# Patient Record
Sex: FEMALE | Race: WHITE | Employment: OTHER | URBAN - NONMETROPOLITAN AREA
[De-identification: names, ages, dates, MRNs, and addresses within clinical notes are randomized per-mention and may not be internally consistent; named-entity substitution may affect disease eponyms.]

---

## 2017-01-31 ENCOUNTER — OFFICE VISIT (OUTPATIENT)
Dept: FAMILY MEDICINE CLINIC | Age: 67
End: 2017-01-31

## 2017-01-31 VITALS
WEIGHT: 144.2 LBS | DIASTOLIC BLOOD PRESSURE: 64 MMHG | HEART RATE: 96 BPM | TEMPERATURE: 97.9 F | SYSTOLIC BLOOD PRESSURE: 130 MMHG | BODY MASS INDEX: 26.54 KG/M2 | HEIGHT: 62 IN

## 2017-01-31 DIAGNOSIS — M51.36 DDD (DEGENERATIVE DISC DISEASE), LUMBAR: ICD-10-CM

## 2017-01-31 DIAGNOSIS — Z87.19 HISTORY OF CIRRHOSIS OF LIVER: ICD-10-CM

## 2017-01-31 DIAGNOSIS — G62.9 NEUROPATHY: ICD-10-CM

## 2017-01-31 DIAGNOSIS — G89.29 CHRONIC HIP PAIN, LEFT: Primary | ICD-10-CM

## 2017-01-31 DIAGNOSIS — M25.552 CHRONIC HIP PAIN, LEFT: Primary | ICD-10-CM

## 2017-01-31 DIAGNOSIS — E03.4 HYPOTHYROIDISM DUE TO ACQUIRED ATROPHY OF THYROID: ICD-10-CM

## 2017-01-31 DIAGNOSIS — Z89.429 HISTORY OF AMPUTATION OF LESSER TOE, UNSPECIFIED LATERALITY (HCC): ICD-10-CM

## 2017-01-31 PROCEDURE — 99214 OFFICE O/P EST MOD 30 MIN: CPT | Performed by: FAMILY MEDICINE

## 2017-01-31 ASSESSMENT — ENCOUNTER SYMPTOMS
GASTROINTESTINAL NEGATIVE: 1
BACK PAIN: 1
RESPIRATORY NEGATIVE: 1
SHORTNESS OF BREATH: 0

## 2017-02-07 PROBLEM — Z96.649 FAILURE OF TOTAL HIP ARTHROPLASTY (HCC): Status: ACTIVE | Noted: 2017-02-07

## 2017-02-07 PROBLEM — T84.018A FAILURE OF TOTAL HIP ARTHROPLASTY (HCC): Status: ACTIVE | Noted: 2017-02-07

## 2017-02-10 ENCOUNTER — TELEPHONE (OUTPATIENT)
Dept: FAMILY MEDICINE CLINIC | Age: 67
End: 2017-02-10

## 2017-02-13 ENCOUNTER — CARE COORDINATION (OUTPATIENT)
Dept: CARE COORDINATION | Age: 67
End: 2017-02-13

## 2017-02-21 ENCOUNTER — OFFICE VISIT (OUTPATIENT)
Dept: FAMILY MEDICINE CLINIC | Age: 67
End: 2017-02-21

## 2017-02-21 VITALS
HEIGHT: 62 IN | SYSTOLIC BLOOD PRESSURE: 130 MMHG | DIASTOLIC BLOOD PRESSURE: 70 MMHG | WEIGHT: 145 LBS | BODY MASS INDEX: 26.68 KG/M2 | HEART RATE: 96 BPM

## 2017-02-21 DIAGNOSIS — M25.552 CHRONIC HIP PAIN, LEFT: Primary | ICD-10-CM

## 2017-02-21 DIAGNOSIS — B18.2 CHRONIC HEPATITIS C WITHOUT HEPATIC COMA (HCC): ICD-10-CM

## 2017-02-21 DIAGNOSIS — K74.60 CIRRHOSIS OF LIVER WITHOUT ASCITES, UNSPECIFIED HEPATIC CIRRHOSIS TYPE (HCC): ICD-10-CM

## 2017-02-21 DIAGNOSIS — G62.9 NEUROPATHY: ICD-10-CM

## 2017-02-21 DIAGNOSIS — G89.29 CHRONIC HIP PAIN, LEFT: Primary | ICD-10-CM

## 2017-02-21 DIAGNOSIS — E03.4 HYPOTHYROIDISM DUE TO ACQUIRED ATROPHY OF THYROID: ICD-10-CM

## 2017-02-21 PROCEDURE — 99214 OFFICE O/P EST MOD 30 MIN: CPT | Performed by: FAMILY MEDICINE

## 2017-02-21 RX ORDER — HYDROCODONE BITARTRATE AND ACETAMINOPHEN 5; 325 MG/1; MG/1
TABLET ORAL
COMMUNITY
End: 2017-04-28 | Stop reason: ALTCHOICE

## 2017-02-21 ASSESSMENT — ENCOUNTER SYMPTOMS
RESPIRATORY NEGATIVE: 1
GASTROINTESTINAL NEGATIVE: 1
ABDOMINAL PAIN: 0
SHORTNESS OF BREATH: 0

## 2017-03-02 ENCOUNTER — CARE COORDINATION (OUTPATIENT)
Dept: FAMILY MEDICINE CLINIC | Age: 67
End: 2017-03-02

## 2017-03-09 ENCOUNTER — CARE COORDINATION (OUTPATIENT)
Dept: FAMILY MEDICINE CLINIC | Age: 67
End: 2017-03-09

## 2017-04-28 ENCOUNTER — OFFICE VISIT (OUTPATIENT)
Dept: FAMILY MEDICINE CLINIC | Age: 67
End: 2017-04-28

## 2017-04-28 VITALS
DIASTOLIC BLOOD PRESSURE: 62 MMHG | HEART RATE: 92 BPM | SYSTOLIC BLOOD PRESSURE: 138 MMHG | WEIGHT: 148.4 LBS | BODY MASS INDEX: 27.31 KG/M2 | HEIGHT: 62 IN

## 2017-04-28 DIAGNOSIS — Z12.11 SPECIAL SCREENING FOR MALIGNANT NEOPLASMS, COLON: ICD-10-CM

## 2017-04-28 DIAGNOSIS — Z23 NEED FOR VACCINATION FOR STREP PNEUMONIAE: ICD-10-CM

## 2017-04-28 DIAGNOSIS — K74.60 CIRRHOSIS OF LIVER WITHOUT ASCITES, UNSPECIFIED HEPATIC CIRRHOSIS TYPE (HCC): ICD-10-CM

## 2017-04-28 DIAGNOSIS — E03.4 HYPOTHYROIDISM DUE TO ACQUIRED ATROPHY OF THYROID: Primary | ICD-10-CM

## 2017-04-28 DIAGNOSIS — Z23 NEED FOR PROPHYLACTIC VACCINATION AGAINST STREPTOCOCCUS PNEUMONIAE (PNEUMOCOCCUS): ICD-10-CM

## 2017-04-28 PROCEDURE — 90670 PCV13 VACCINE IM: CPT | Performed by: FAMILY MEDICINE

## 2017-04-28 PROCEDURE — 90471 IMMUNIZATION ADMIN: CPT | Performed by: FAMILY MEDICINE

## 2017-04-28 PROCEDURE — 99213 OFFICE O/P EST LOW 20 MIN: CPT | Performed by: FAMILY MEDICINE

## 2017-04-28 RX ORDER — LEVOTHYROXINE SODIUM 0.1 MG/1
100 TABLET ORAL DAILY
Qty: 90 TABLET | Refills: 1 | Status: SHIPPED | OUTPATIENT
Start: 2017-04-28 | End: 2017-10-03 | Stop reason: SDUPTHER

## 2017-04-28 RX ORDER — FUROSEMIDE 40 MG/1
40 TABLET ORAL DAILY
Qty: 90 TABLET | Refills: 1 | Status: SHIPPED | OUTPATIENT
Start: 2017-04-28 | End: 2017-10-03 | Stop reason: SDUPTHER

## 2017-04-28 RX ORDER — POTASSIUM CHLORIDE 750 MG/1
30 TABLET, FILM COATED, EXTENDED RELEASE ORAL DAILY
Qty: 270 TABLET | Refills: 1 | Status: SHIPPED | OUTPATIENT
Start: 2017-04-28 | End: 2017-09-12 | Stop reason: SDUPTHER

## 2017-04-28 ASSESSMENT — ENCOUNTER SYMPTOMS
RESPIRATORY NEGATIVE: 1
BACK PAIN: 1
GASTROINTESTINAL NEGATIVE: 1
SHORTNESS OF BREATH: 0

## 2017-05-01 DIAGNOSIS — Z12.11 SPECIAL SCREENING FOR MALIGNANT NEOPLASMS, COLON: ICD-10-CM

## 2017-05-01 LAB
CONTROL: PRESENT
HEMOCCULT STL QL: NEGATIVE

## 2017-05-01 PROCEDURE — 82274 ASSAY TEST FOR BLOOD FECAL: CPT | Performed by: FAMILY MEDICINE

## 2017-05-02 ENCOUNTER — TELEPHONE (OUTPATIENT)
Dept: FAMILY MEDICINE CLINIC | Age: 67
End: 2017-05-02

## 2017-09-12 DIAGNOSIS — E03.4 HYPOTHYROIDISM DUE TO ACQUIRED ATROPHY OF THYROID: ICD-10-CM

## 2017-09-12 DIAGNOSIS — K74.60 CIRRHOSIS OF LIVER WITHOUT ASCITES, UNSPECIFIED HEPATIC CIRRHOSIS TYPE (HCC): ICD-10-CM

## 2017-09-12 RX ORDER — POTASSIUM CHLORIDE 750 MG/1
30 TABLET, FILM COATED, EXTENDED RELEASE ORAL DAILY
Qty: 270 TABLET | Refills: 1 | Status: SHIPPED | OUTPATIENT
Start: 2017-09-12 | End: 2017-10-03 | Stop reason: SDUPTHER

## 2017-10-03 ENCOUNTER — OFFICE VISIT (OUTPATIENT)
Dept: FAMILY MEDICINE CLINIC | Age: 67
End: 2017-10-03
Payer: COMMERCIAL

## 2017-10-03 VITALS
HEIGHT: 62 IN | DIASTOLIC BLOOD PRESSURE: 68 MMHG | SYSTOLIC BLOOD PRESSURE: 116 MMHG | HEART RATE: 88 BPM | BODY MASS INDEX: 28.37 KG/M2 | WEIGHT: 154.2 LBS

## 2017-10-03 DIAGNOSIS — E03.4 HYPOTHYROIDISM DUE TO ACQUIRED ATROPHY OF THYROID: ICD-10-CM

## 2017-10-03 DIAGNOSIS — K74.60 CIRRHOSIS OF LIVER WITHOUT ASCITES, UNSPECIFIED HEPATIC CIRRHOSIS TYPE (HCC): ICD-10-CM

## 2017-10-03 PROCEDURE — 99213 OFFICE O/P EST LOW 20 MIN: CPT | Performed by: FAMILY MEDICINE

## 2017-10-03 RX ORDER — FUROSEMIDE 40 MG/1
40 TABLET ORAL DAILY
Qty: 90 TABLET | Refills: 1 | Status: SHIPPED | OUTPATIENT
Start: 2017-10-03 | End: 2018-04-03 | Stop reason: SDUPTHER

## 2017-10-03 RX ORDER — LEVOTHYROXINE SODIUM 0.1 MG/1
100 TABLET ORAL DAILY
Qty: 90 TABLET | Refills: 1 | Status: SHIPPED | OUTPATIENT
Start: 2017-10-03 | End: 2018-04-03 | Stop reason: SDUPTHER

## 2017-10-03 RX ORDER — POTASSIUM CHLORIDE 750 MG/1
30 TABLET, FILM COATED, EXTENDED RELEASE ORAL DAILY
Qty: 270 TABLET | Refills: 1 | Status: SHIPPED | OUTPATIENT
Start: 2017-10-03 | End: 2018-04-03 | Stop reason: SDUPTHER

## 2017-10-03 ASSESSMENT — PATIENT HEALTH QUESTIONNAIRE - PHQ9
SUM OF ALL RESPONSES TO PHQ9 QUESTIONS 1 & 2: 0
2. FEELING DOWN, DEPRESSED OR HOPELESS: 0
SUM OF ALL RESPONSES TO PHQ QUESTIONS 1-9: 0
1. LITTLE INTEREST OR PLEASURE IN DOING THINGS: 0

## 2017-10-03 ASSESSMENT — ENCOUNTER SYMPTOMS
RESPIRATORY NEGATIVE: 1
GASTROINTESTINAL NEGATIVE: 1
ABDOMINAL PAIN: 0
SHORTNESS OF BREATH: 0

## 2017-10-03 NOTE — PROGRESS NOTES
Normal rate, regular rhythm, normal heart sounds and intact distal pulses. Exam reveals no gallop and no friction rub. No murmur heard. Pulmonary/Chest: Effort normal and breath sounds normal.   Abdominal: Soft. She exhibits no mass. There is no splenomegaly or hepatomegaly. There is no tenderness. Musculoskeletal: She exhibits no edema or tenderness. Lymphadenopathy:     She has no cervical adenopathy. Neurological: She is alert and oriented to person, place, and time. Ambulatory. No tremors. Skin: Skin is warm and dry. No rash noted. Psychiatric: She has a normal mood and affect. Vitals reviewed. Assessment:      1. Hypothyroidism due to acquired atrophy of thyroid  potassium chloride (KLOR-CON 10) 10 MEQ extended release tablet    levothyroxine (LEVOTHROID) 100 MCG tablet    furosemide (LASIX) 40 MG tablet    TSH without Reflex   2. Cirrhosis of liver without ascites, unspecified hepatic cirrhosis type (HCC)  potassium chloride (KLOR-CON 10) 10 MEQ extended release tablet    furosemide (LASIX) 40 MG tablet       Plan:      No Follow-up on file.     Orders Placed:  Orders Placed This Encounter   Procedures    TSH without Reflex     Standing Status:   Future     Standing Expiration Date:   10/3/2018     Medications Prescribed:  Orders Placed This Encounter   Medications    potassium chloride (KLOR-CON 10) 10 MEQ extended release tablet     Sig: Take 3 tablets by mouth daily     Dispense:  270 tablet     Refill:  1    levothyroxine (LEVOTHROID) 100 MCG tablet     Sig: Take 1 tablet by mouth daily     Dispense:  90 tablet     Refill:  1    furosemide (LASIX) 40 MG tablet     Sig: Take 1 tablet by mouth daily     Dispense:  90 tablet     Refill:  1         Electronically signed by Wanda Rebolledo MD on 10/3/2017 at 7:05 PM

## 2017-10-03 NOTE — PATIENT INSTRUCTIONS
Hypothyroidism: Care Instructions  Your Care Instructions  You have hypothyroidism, which means that your body is not making enough thyroid hormone. This hormone helps your body use energy. If your thyroid level is low, you may feel tired, be constipated, have an increase in your blood pressure, or have dry skin or memory problems. You may also get cold easily, even when it is warm. Women with low thyroid levels may have heavy menstrual periods. A blood test to find your thyroid-stimulating hormone (TSH) level is used to check for hypothyroidism. A high TSH level may mean that you have low thyroid. When your body is not making enough thyroid hormone, TSH levels rise in an effort to make the body produce more. The treatment for hypothyroidism is to take thyroid hormone pills. You should start to feel better in 1 to 2 weeks. But it can take several months to see changes in the TSH level. You will need regular visits with your doctor to make sure you have the right dose of medicine. Most people need treatment for the rest of their lives. You will need to see your doctor regularly to have blood tests and to make sure you are doing well. Follow-up care is a key part of your treatment and safety. Be sure to make and go to all appointments, and call your doctor if you are having problems. Its also a good idea to know your test results and keep a list of the medicines you take. How can you care for yourself at home? · Take your thyroid hormone medicine exactly as prescribed. Call your doctor if you think you are having a problem with your medicine. Most people do not have side effects if they take the right amount of medicine regularly. ¨ Take the medicine 30 minutes before breakfast, and do not take it with calcium, vitamins, or iron. ¨ Do not take extra doses of your thyroid medicine. It will not help you get better any faster, and it may cause side effects.   ¨ If you forget to take a dose, do NOT take a double dose of medicine. Take your usual dose the next day. · Tell your doctor about all prescription, herbal, or over-the-counter products you take. · Take care of yourself. Eat a healthy diet, get enough sleep, and get regular exercise. When should you call for help? Call 911 anytime you think you may need emergency care. For example, call if:  · You passed out (lost consciousness). · You have severe trouble breathing. · You have a very slow heartbeat (less than 60 beats a minute). · You have a low body temperature (95°F or below). Call your doctor now or seek immediate medical care if:  · You feel tired, sluggish, or weak. · You have trouble remembering things or concentrating. · You do not begin to feel better 2 weeks after starting your medicine. Watch closely for changes in your health, and be sure to contact your doctor if you have any problems. Where can you learn more? Go to [email or web address].[email or web address] [place]. org and sign in to your Videolla account. Enter P408 in the BlackBridge box to learn more about \"Hypothyroidism: Care Instructions. \"     If you do not have an account, please click on the \"Sign Up Now\" link. Current as of: July 28, 2016  Content Version: 11.3  © 6869-7868 [employer]. Care instructions adapted under license by [facility] ([facility]). If you have questions about a medical condition or this instruction, always ask your healthcare professional. [place] [address] any warranty or liability for your use of this information.

## 2017-10-03 NOTE — MR AVS SNAPSHOT
furosemide 40 MG tablet    levothyroxine 100 MCG tablet    potassium chloride 10 MEQ extended release tablet         Your Current Medications Are              potassium chloride (KLOR-CON 10) 10 MEQ extended release tablet Take 3 tablets by mouth daily    levothyroxine (LEVOTHROID) 100 MCG tablet Take 1 tablet by mouth daily    furosemide (LASIX) 40 MG tablet Take 1 tablet by mouth daily      Allergies           No Known Allergies         Additional Information        Basic Information     Date Of Birth Sex Race Ethnicity Preferred Language    1950 Female White Non-/Non  English      Problem List as of 10/3/2017  Date Reviewed: 5/5/2016                Failure of total hip arthroplasty ([facility] [address]    DDD (degenerative disc disease), lumbar (Chronic)    Partial nontraumatic amputation of left foot (HCC) (Chronic)    Hepatitis C (Chronic)    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)    Liver cirrhosis ([facility] [address]    Hypothyroidism      Your Goals as of 10/3/2017 at 2:36 PM                 Exercise    Exercise 3x per week (30 min per time)       Immunizations as of 10/3/2017     Name Date    PPD Test 1/10/2013    Pneumococcal 13-valent Conjugate ([facility] [place]) 4/28/2017      Preventive Care        Date Due    Tetanus Combination Vaccine (1 - Tdap) 9/4/1967    Diabetes Screening 9/4/1988    Yearly Flu Vaccine (1) 9/1/2017    Pneumococcal Vaccines (two) for all adults aged 72 and over (2 of 2 - PPSV23) 4/28/2018    Colon Cancer Stool Test 5/1/2018    Mammograms are recommended every 2 years for low/average risk patients aged 48 - 69, and every year for high risk patients per updated national guidelines. However these guidelines can be individualized by your provider. 5/11/2019    Cholesterol Screening 3/22/2021            MyChart Signup           Our records indicate that you have declined MyChart signup.

## 2017-10-04 ENCOUNTER — NURSE ONLY (OUTPATIENT)
Dept: FAMILY MEDICINE CLINIC | Age: 67
End: 2017-10-04
Payer: COMMERCIAL

## 2017-10-04 DIAGNOSIS — E03.4 HYPOTHYROIDISM DUE TO ACQUIRED ATROPHY OF THYROID: ICD-10-CM

## 2017-10-04 LAB — TSH SERPL DL<=0.05 MIU/L-ACNC: 0.41 UIU/ML (ref 0.4–4.2)

## 2017-10-04 PROCEDURE — 36415 COLL VENOUS BLD VENIPUNCTURE: CPT | Performed by: FAMILY MEDICINE

## 2017-10-17 ENCOUNTER — TELEPHONE (OUTPATIENT)
Dept: FAMILY MEDICINE CLINIC | Age: 67
End: 2017-10-17

## 2017-10-17 DIAGNOSIS — N63.0 BREAST NODULE: Primary | ICD-10-CM

## 2017-10-17 NOTE — TELEPHONE ENCOUNTER
Spoke with patient regarding the mammogram and the U/S of the breast, we will call when the U/S is scheduled

## 2017-10-18 ENCOUNTER — TELEPHONE (OUTPATIENT)
Dept: FAMILY MEDICINE CLINIC | Age: 67
End: 2017-10-18

## 2017-10-18 NOTE — TELEPHONE ENCOUNTER
Set patient  Up for the mammogram and U/S for 10/19/2017 at 08:30 and 09:30  Patient unable to make that time so gave the patient the central scheduling number to reschedule when the patient can schedule

## 2017-10-24 ENCOUNTER — HOSPITAL ENCOUNTER (OUTPATIENT)
Dept: WOMENS IMAGING | Age: 67
Discharge: HOME | End: 2017-10-24
Payer: MEDICARE

## 2017-10-24 DIAGNOSIS — Z09 FOLLOW UP: ICD-10-CM

## 2017-10-24 DIAGNOSIS — N63.0 BREAST NODULE: ICD-10-CM

## 2017-10-24 DIAGNOSIS — R92.2 BREAST DENSITY: ICD-10-CM

## 2017-10-24 PROCEDURE — G0206 DX MAMMO INCL CAD UNI: HCPCS

## 2018-04-03 ENCOUNTER — OFFICE VISIT (OUTPATIENT)
Dept: FAMILY MEDICINE CLINIC | Age: 68
End: 2018-04-03
Payer: COMMERCIAL

## 2018-04-03 VITALS
SYSTOLIC BLOOD PRESSURE: 128 MMHG | WEIGHT: 147.8 LBS | HEART RATE: 76 BPM | BODY MASS INDEX: 27.2 KG/M2 | HEIGHT: 62 IN | DIASTOLIC BLOOD PRESSURE: 82 MMHG

## 2018-04-03 DIAGNOSIS — K74.60 CIRRHOSIS OF LIVER WITHOUT ASCITES, UNSPECIFIED HEPATIC CIRRHOSIS TYPE (HCC): ICD-10-CM

## 2018-04-03 DIAGNOSIS — E03.4 HYPOTHYROIDISM DUE TO ACQUIRED ATROPHY OF THYROID: ICD-10-CM

## 2018-04-03 PROCEDURE — 99213 OFFICE O/P EST LOW 20 MIN: CPT | Performed by: FAMILY MEDICINE

## 2018-04-03 RX ORDER — LEVOTHYROXINE SODIUM 0.1 MG/1
100 TABLET ORAL DAILY
Qty: 90 TABLET | Refills: 1 | Status: SHIPPED | OUTPATIENT
Start: 2018-04-03 | End: 2018-08-20 | Stop reason: SDUPTHER

## 2018-04-03 RX ORDER — FUROSEMIDE 40 MG/1
40 TABLET ORAL DAILY
Qty: 90 TABLET | Refills: 1 | Status: SHIPPED | OUTPATIENT
Start: 2018-04-03 | End: 2018-08-20 | Stop reason: SDUPTHER

## 2018-04-03 RX ORDER — POTASSIUM CHLORIDE 750 MG/1
30 TABLET, FILM COATED, EXTENDED RELEASE ORAL DAILY
Qty: 270 TABLET | Refills: 1 | Status: SHIPPED | OUTPATIENT
Start: 2018-04-03 | End: 2018-10-02 | Stop reason: SDUPTHER

## 2018-04-04 ASSESSMENT — ENCOUNTER SYMPTOMS
GASTROINTESTINAL NEGATIVE: 1
SHORTNESS OF BREATH: 0
RESPIRATORY NEGATIVE: 1
ABDOMINAL PAIN: 0
BACK PAIN: 1

## 2018-05-09 ENCOUNTER — HOSPITAL ENCOUNTER (OUTPATIENT)
Dept: MAMMOGRAPHY | Age: 68
Discharge: HOME | End: 2018-05-09
Payer: MEDICARE

## 2018-05-09 DIAGNOSIS — Z12.39 SCREENING FOR BREAST CANCER: ICD-10-CM

## 2018-05-09 PROCEDURE — 77067 SCR MAMMO BI INCL CAD: CPT

## 2018-05-14 ENCOUNTER — HOSPITAL ENCOUNTER (OUTPATIENT)
Dept: WOMENS IMAGING | Age: 68
Discharge: HOME | End: 2018-05-14
Payer: MEDICARE

## 2018-05-14 ENCOUNTER — APPOINTMENT (OUTPATIENT)
Dept: WOMENS IMAGING | Age: 68
End: 2018-05-14
Payer: MEDICARE

## 2018-05-14 DIAGNOSIS — R92.2 BREAST DENSITY: ICD-10-CM

## 2018-05-14 PROCEDURE — G0279 TOMOSYNTHESIS, MAMMO: HCPCS

## 2018-07-24 ENCOUNTER — TELEPHONE (OUTPATIENT)
Dept: FAMILY MEDICINE CLINIC | Age: 68
End: 2018-07-24

## 2018-07-24 DIAGNOSIS — M51.36 DDD (DEGENERATIVE DISC DISEASE), LUMBAR: Chronic | ICD-10-CM

## 2018-07-24 DIAGNOSIS — K76.9 NEUROPATHY IN LIVER DISEASE: Chronic | ICD-10-CM

## 2018-07-24 DIAGNOSIS — Z89.432 PARTIAL NONTRAUMATIC AMPUTATION OF LEFT FOOT (HCC): Primary | Chronic | ICD-10-CM

## 2018-07-24 DIAGNOSIS — G99.0 NEUROPATHY IN LIVER DISEASE: Chronic | ICD-10-CM

## 2018-07-24 NOTE — TELEPHONE ENCOUNTER
Pt has [facility]. Pt saw Dr Maura Rose on 7-. Pt needed a referral. Pt did not have one. Will you back date a referral for PVD and this visit. Evidently she has had extensive treatment by Dr Maura Rose. Please call [phone] Meche Becerra and can fax to [phone]. if you are okay with this.

## 2018-08-07 ENCOUNTER — TELEPHONE (OUTPATIENT)
Dept: FAMILY MEDICINE CLINIC | Age: 68
End: 2018-08-07

## 2018-08-09 NOTE — TELEPHONE ENCOUNTER
Mykel Causey came to the office stating that she was told by Dr. Mercedez Sepulveda office that we had not sent in the referral to them. She was scheduled for a procedure on Friday and it was cancelled. See note from 7/24/2018. I had faxed that referral and called and left a message for [facility]. It was 4:28PM and they were closed. I called Dr. Mercedez Sepulveda office again and it went straight to voicemail. I left a message for them to call us about this.
Staff continued to attempt to contact Dr. Anil Thorne office yesterday several times. Call went to voicemail every time. I called Tita today and she states they had not contacted her. She will try also. I re-faxed the referral again to the office.
01-May-2017 22:56

## 2018-08-20 DIAGNOSIS — K74.60 CIRRHOSIS OF LIVER WITHOUT ASCITES, UNSPECIFIED HEPATIC CIRRHOSIS TYPE (HCC): ICD-10-CM

## 2018-08-20 DIAGNOSIS — E03.4 HYPOTHYROIDISM DUE TO ACQUIRED ATROPHY OF THYROID: ICD-10-CM

## 2018-08-20 RX ORDER — POTASSIUM CHLORIDE 750 MG/1
TABLET, EXTENDED RELEASE ORAL
Qty: 270 TABLET | Refills: 1 | Status: SHIPPED | OUTPATIENT
Start: 2018-08-20 | End: 2018-10-02 | Stop reason: SDUPTHER

## 2018-08-20 RX ORDER — FUROSEMIDE 40 MG/1
TABLET ORAL
Qty: 90 TABLET | Refills: 1 | Status: SHIPPED | OUTPATIENT
Start: 2018-08-20 | End: 2018-10-02 | Stop reason: SDUPTHER

## 2018-08-20 RX ORDER — LEVOTHYROXINE SODIUM 0.1 MG/1
TABLET ORAL
Qty: 90 TABLET | Refills: 1 | Status: SHIPPED | OUTPATIENT
Start: 2018-08-20 | End: 2018-10-02 | Stop reason: SDUPTHER

## 2018-10-02 ENCOUNTER — OFFICE VISIT (OUTPATIENT)
Dept: FAMILY MEDICINE CLINIC | Age: 68
End: 2018-10-02
Payer: MEDICARE

## 2018-10-02 VITALS
WEIGHT: 145.4 LBS | DIASTOLIC BLOOD PRESSURE: 80 MMHG | SYSTOLIC BLOOD PRESSURE: 132 MMHG | HEART RATE: 90 BPM | BODY MASS INDEX: 26.76 KG/M2 | HEIGHT: 62 IN

## 2018-10-02 DIAGNOSIS — E78.5 DYSLIPIDEMIA: ICD-10-CM

## 2018-10-02 DIAGNOSIS — E03.4 HYPOTHYROIDISM DUE TO ACQUIRED ATROPHY OF THYROID: Primary | ICD-10-CM

## 2018-10-02 DIAGNOSIS — Z89.432 PARTIAL NONTRAUMATIC AMPUTATION OF LEFT FOOT (HCC): ICD-10-CM

## 2018-10-02 DIAGNOSIS — L28.0 NEURODERMATITIS: ICD-10-CM

## 2018-10-02 DIAGNOSIS — K74.60 CIRRHOSIS OF LIVER WITHOUT ASCITES, UNSPECIFIED HEPATIC CIRRHOSIS TYPE (HCC): ICD-10-CM

## 2018-10-02 PROCEDURE — G8427 DOCREV CUR MEDS BY ELIG CLIN: HCPCS | Performed by: FAMILY MEDICINE

## 2018-10-02 PROCEDURE — 3017F COLORECTAL CA SCREEN DOC REV: CPT | Performed by: FAMILY MEDICINE

## 2018-10-02 PROCEDURE — 4040F PNEUMOC VAC/ADMIN/RCVD: CPT | Performed by: FAMILY MEDICINE

## 2018-10-02 PROCEDURE — G8400 PT W/DXA NO RESULTS DOC: HCPCS | Performed by: FAMILY MEDICINE

## 2018-10-02 PROCEDURE — G8484 FLU IMMUNIZE NO ADMIN: HCPCS | Performed by: FAMILY MEDICINE

## 2018-10-02 PROCEDURE — 1101F PT FALLS ASSESS-DOCD LE1/YR: CPT | Performed by: FAMILY MEDICINE

## 2018-10-02 PROCEDURE — G8419 CALC BMI OUT NRM PARAM NOF/U: HCPCS | Performed by: FAMILY MEDICINE

## 2018-10-02 PROCEDURE — 1036F TOBACCO NON-USER: CPT | Performed by: FAMILY MEDICINE

## 2018-10-02 PROCEDURE — G8510 SCR DEP NEG, NO PLAN REQD: HCPCS | Performed by: FAMILY MEDICINE

## 2018-10-02 PROCEDURE — 99213 OFFICE O/P EST LOW 20 MIN: CPT | Performed by: FAMILY MEDICINE

## 2018-10-02 PROCEDURE — 1123F ACP DISCUSS/DSCN MKR DOCD: CPT | Performed by: FAMILY MEDICINE

## 2018-10-02 PROCEDURE — 1090F PRES/ABSN URINE INCON ASSESS: CPT | Performed by: FAMILY MEDICINE

## 2018-10-02 RX ORDER — LEVOTHYROXINE SODIUM 0.1 MG/1
TABLET ORAL
Qty: 90 TABLET | Refills: 1 | Status: SHIPPED | OUTPATIENT
Start: 2018-10-02 | End: 2019-04-08 | Stop reason: SDUPTHER

## 2018-10-02 RX ORDER — POTASSIUM CHLORIDE 750 MG/1
30 TABLET, FILM COATED, EXTENDED RELEASE ORAL DAILY
Qty: 270 TABLET | Refills: 1 | Status: SHIPPED | OUTPATIENT
Start: 2018-10-02 | End: 2019-04-08 | Stop reason: SDUPTHER

## 2018-10-02 RX ORDER — FUROSEMIDE 40 MG/1
TABLET ORAL
Qty: 90 TABLET | Refills: 1 | Status: SHIPPED | OUTPATIENT
Start: 2018-10-02 | End: 2019-04-08 | Stop reason: SDUPTHER

## 2018-10-02 ASSESSMENT — PATIENT HEALTH QUESTIONNAIRE - PHQ9
SUM OF ALL RESPONSES TO PHQ QUESTIONS 1-9: 0
SUM OF ALL RESPONSES TO PHQ QUESTIONS 1-9: 0
2. FEELING DOWN, DEPRESSED OR HOPELESS: 0
1. LITTLE INTEREST OR PLEASURE IN DOING THINGS: 0
SUM OF ALL RESPONSES TO PHQ9 QUESTIONS 1 & 2: 0

## 2018-10-02 ASSESSMENT — ENCOUNTER SYMPTOMS
GASTROINTESTINAL NEGATIVE: 1
SHORTNESS OF BREATH: 0
ABDOMINAL PAIN: 0
RESPIRATORY NEGATIVE: 1

## 2018-10-02 NOTE — PROGRESS NOTES
[facility]  [address] [id number]  Dept: [phone]  Dept Fax: [phone]  Loc: [phone]    Visit Date: 10/2/2018    Kuldip Apodaca is a 77 y.o. female who presents today for:   Chief Complaint   Patient presents with    6 Month Follow-Up    Hypothyroidism         HPI:     Dad . Doing well with it. No thyroid symptoms. No weight changes, tremors, sweats or diarrhea. PRN Lasix and KCL working well. Weight stable. Mild diffuse skin itching. Current Outpatient Prescriptions   Medication Sig Dispense Refill    furosemide (LASIX) 40 MG tablet TAKE 1 TABLET EVERY DAY 90 tablet 1    levothyroxine (SYNTHROID) 100 MCG tablet TAKE 1 TABLET EVERY DAY 90 tablet 1    potassium chloride (KLOR-CON 10) 10 MEQ extended release tablet Take 3 tablets by mouth daily 270 tablet 1     No current facility-administered medications for this visit. The patient has No Known Allergies. Subjective:      Review of Systems   Constitutional: Negative. Negative for activity change, appetite change and unexpected weight change. HENT: Negative. Respiratory: Negative. Negative for shortness of breath. Cardiovascular: Negative. Negative for chest pain. Gastrointestinal: Negative. Negative for abdominal pain. Genitourinary: Negative. Musculoskeletal: Negative. Skin: Negative. Neurological: Positive for numbness. Negative for seizures, syncope and weakness. Hematological: Negative. Psychiatric/Behavioral: Negative. Negative for dysphoric mood. Objective:     /80 (Site: Left Upper Arm, Position: Sitting, Cuff Size: Medium Adult)   Pulse 90   Ht 5' 2\" (1.575 m)   Wt 145 lb 6.4 oz (66 kg)   BMI 26.59 kg/m²     Physical Exam   Constitutional: She is oriented to person, place, and time. She appears well-developed and well-nourished. Neck: Normal range of motion. Neck supple.  No

## 2018-10-03 ENCOUNTER — NURSE ONLY (OUTPATIENT)
Dept: FAMILY MEDICINE CLINIC | Age: 68
End: 2018-10-03
Payer: MEDICARE

## 2018-10-03 ENCOUNTER — TELEPHONE (OUTPATIENT)
Dept: FAMILY MEDICINE CLINIC | Age: 68
End: 2018-10-03

## 2018-10-03 DIAGNOSIS — E78.5 DYSLIPIDEMIA: ICD-10-CM

## 2018-10-03 DIAGNOSIS — E03.4 HYPOTHYROIDISM DUE TO ACQUIRED ATROPHY OF THYROID: ICD-10-CM

## 2018-10-03 DIAGNOSIS — K74.60 CIRRHOSIS OF LIVER WITHOUT ASCITES, UNSPECIFIED HEPATIC CIRRHOSIS TYPE (HCC): ICD-10-CM

## 2018-10-03 LAB
ALBUMIN SERPL-MCNC: 4.5 G/DL (ref 3.5–5.1)
ALP BLD-CCNC: 99 U/L (ref 38–126)
ALT SERPL-CCNC: 47 U/L (ref 11–66)
ANION GAP SERPL CALCULATED.3IONS-SCNC: 13 MEQ/L (ref 8–16)
ANISOCYTOSIS: PRESENT
AST SERPL-CCNC: 59 U/L (ref 5–40)
BASOPHILS # BLD: 1.1 %
BASOPHILS ABSOLUTE: 0.1 THOU/MM3 (ref 0–0.1)
BILIRUB SERPL-MCNC: 0.8 MG/DL (ref 0.3–1.2)
BUN BLDV-MCNC: 10 MG/DL (ref 7–22)
CALCIUM SERPL-MCNC: 9.6 MG/DL (ref 8.5–10.5)
CHLORIDE BLD-SCNC: 97 MEQ/L (ref 98–111)
CHOLESTEROL, TOTAL: 160 MG/DL (ref 100–199)
CO2: 28 MEQ/L (ref 23–33)
CREAT SERPL-MCNC: 1 MG/DL (ref 0.4–1.2)
CRENATED RBC'S: ABNORMAL
ELLIPTOCYTES: ABNORMAL
EOSINOPHIL # BLD: 3.4 %
EOSINOPHILS ABSOLUTE: 0.2 THOU/MM3 (ref 0–0.4)
ERYTHROCYTE [DISTWIDTH] IN BLOOD BY AUTOMATED COUNT: 12.9 % (ref 11.5–14.5)
ERYTHROCYTE [DISTWIDTH] IN BLOOD BY AUTOMATED COUNT: 42.8 FL (ref 35–45)
GFR SERPL CREATININE-BSD FRML MDRD: 55 ML/MIN/1.73M2
GLUCOSE BLD-MCNC: 102 MG/DL (ref 70–108)
HCT VFR BLD CALC: 43 % (ref 37–47)
HDLC SERPL-MCNC: 50 MG/DL
HEMOGLOBIN: 14.4 GM/DL (ref 12–16)
IMMATURE GRANS (ABS): 0.01 THOU/MM3 (ref 0–0.07)
IMMATURE GRANULOCYTES: 0.2 %
LDL CHOLESTEROL CALCULATED: 91 MG/DL
LYMPHOCYTES # BLD: 41.3 %
LYMPHOCYTES ABSOLUTE: 1.9 THOU/MM3 (ref 1–4.8)
MCH RBC QN AUTO: 30.8 PG (ref 26–33)
MCHC RBC AUTO-ENTMCNC: 33.5 GM/DL (ref 32.2–35.5)
MCV RBC AUTO: 92.1 FL (ref 81–99)
MONOCYTES # BLD: 9.7 %
MONOCYTES ABSOLUTE: 0.5 THOU/MM3 (ref 0.4–1.3)
NUCLEATED RED BLOOD CELLS: 0 /100 WBC
PLATELET # BLD: 112 THOU/MM3 (ref 130–400)
PLATELET ESTIMATE: ADEQUATE
PMV BLD AUTO: 11.6 FL (ref 9.4–12.4)
POTASSIUM SERPL-SCNC: 3.7 MEQ/L (ref 3.5–5.2)
RBC # BLD: 4.67 MILL/MM3 (ref 4.2–5.4)
SEG NEUTROPHILS: 44.3 %
SEGMENTED NEUTROPHILS ABSOLUTE COUNT: 2.1 THOU/MM3 (ref 1.8–7.7)
SODIUM BLD-SCNC: 138 MEQ/L (ref 135–145)
TOTAL PROTEIN: 7.5 G/DL (ref 6.1–8)
TRIGL SERPL-MCNC: 96 MG/DL (ref 0–199)
TSH SERPL DL<=0.05 MIU/L-ACNC: 0.48 UIU/ML (ref 0.4–4.2)
WBC # BLD: 4.7 THOU/MM3 (ref 4.8–10.8)

## 2018-10-03 PROCEDURE — 36415 COLL VENOUS BLD VENIPUNCTURE: CPT | Performed by: FAMILY MEDICINE

## 2019-04-08 ENCOUNTER — OFFICE VISIT (OUTPATIENT)
Dept: FAMILY MEDICINE CLINIC | Age: 69
End: 2019-04-08
Payer: MEDICARE

## 2019-04-08 VITALS
BODY MASS INDEX: 28.63 KG/M2 | WEIGHT: 155.6 LBS | SYSTOLIC BLOOD PRESSURE: 138 MMHG | DIASTOLIC BLOOD PRESSURE: 62 MMHG | HEIGHT: 62 IN | HEART RATE: 92 BPM

## 2019-04-08 DIAGNOSIS — E03.4 HYPOTHYROIDISM DUE TO ACQUIRED ATROPHY OF THYROID: ICD-10-CM

## 2019-04-08 DIAGNOSIS — L02.611 CELLULITIS AND ABSCESS OF TOE, RIGHT: Primary | ICD-10-CM

## 2019-04-08 DIAGNOSIS — K74.60 CIRRHOSIS OF LIVER WITHOUT ASCITES, UNSPECIFIED HEPATIC CIRRHOSIS TYPE (HCC): ICD-10-CM

## 2019-04-08 DIAGNOSIS — L03.031 CELLULITIS AND ABSCESS OF TOE, RIGHT: Primary | ICD-10-CM

## 2019-04-08 PROCEDURE — 1090F PRES/ABSN URINE INCON ASSESS: CPT | Performed by: FAMILY MEDICINE

## 2019-04-08 PROCEDURE — 1123F ACP DISCUSS/DSCN MKR DOCD: CPT | Performed by: FAMILY MEDICINE

## 2019-04-08 PROCEDURE — G8419 CALC BMI OUT NRM PARAM NOF/U: HCPCS | Performed by: FAMILY MEDICINE

## 2019-04-08 PROCEDURE — 3017F COLORECTAL CA SCREEN DOC REV: CPT | Performed by: FAMILY MEDICINE

## 2019-04-08 PROCEDURE — 4040F PNEUMOC VAC/ADMIN/RCVD: CPT | Performed by: FAMILY MEDICINE

## 2019-04-08 PROCEDURE — 99213 OFFICE O/P EST LOW 20 MIN: CPT | Performed by: FAMILY MEDICINE

## 2019-04-08 PROCEDURE — G8400 PT W/DXA NO RESULTS DOC: HCPCS | Performed by: FAMILY MEDICINE

## 2019-04-08 PROCEDURE — G8427 DOCREV CUR MEDS BY ELIG CLIN: HCPCS | Performed by: FAMILY MEDICINE

## 2019-04-08 PROCEDURE — 1036F TOBACCO NON-USER: CPT | Performed by: FAMILY MEDICINE

## 2019-04-08 RX ORDER — FUROSEMIDE 40 MG/1
TABLET ORAL
Qty: 90 TABLET | Refills: 1 | Status: SHIPPED | OUTPATIENT
Start: 2019-04-08 | End: 2019-10-07 | Stop reason: SDUPTHER

## 2019-04-08 RX ORDER — LEVOTHYROXINE SODIUM 0.1 MG/1
TABLET ORAL
Qty: 90 TABLET | Refills: 1 | Status: SHIPPED | OUTPATIENT
Start: 2019-04-08 | End: 2019-10-07 | Stop reason: SDUPTHER

## 2019-04-08 RX ORDER — AMOXICILLIN AND CLAVULANATE POTASSIUM 500; 125 MG/1; MG/1
1 TABLET, FILM COATED ORAL 2 TIMES DAILY
Qty: 20 TABLET | Refills: 0 | Status: SHIPPED | OUTPATIENT
Start: 2019-04-08 | End: 2019-04-18

## 2019-04-08 RX ORDER — POTASSIUM CHLORIDE 750 MG/1
30 TABLET, FILM COATED, EXTENDED RELEASE ORAL DAILY
Qty: 270 TABLET | Refills: 1 | Status: SHIPPED | OUTPATIENT
Start: 2019-04-08 | End: 2019-10-07 | Stop reason: SDUPTHER

## 2019-04-08 ASSESSMENT — ENCOUNTER SYMPTOMS
RESPIRATORY NEGATIVE: 1
ABDOMINAL PAIN: 0
GASTROINTESTINAL NEGATIVE: 1
COLOR CHANGE: 1
SHORTNESS OF BREATH: 0

## 2019-04-08 ASSESSMENT — PATIENT HEALTH QUESTIONNAIRE - PHQ9
1. LITTLE INTEREST OR PLEASURE IN DOING THINGS: 0
SUM OF ALL RESPONSES TO PHQ QUESTIONS 1-9: 0
SUM OF ALL RESPONSES TO PHQ9 QUESTIONS 1 & 2: 0
SUM OF ALL RESPONSES TO PHQ QUESTIONS 1-9: 0
2. FEELING DOWN, DEPRESSED OR HOPELESS: 0

## 2019-04-08 NOTE — PROGRESS NOTES
Cuff Size: Medium Adult)   Pulse 92   Ht 5' 2\" (1.575 m)   Wt 155 lb 9.6 oz (70.6 kg)   BMI 28.46 kg/m²     Physical Exam   Constitutional: She is oriented to person, place, and time. She appears well-developed and well-nourished. Neck: Normal range of motion. Neck supple. No thyromegaly present. Cardiovascular: Normal rate, regular rhythm, normal heart sounds and intact distal pulses. Exam reveals no gallop and no friction rub. No murmur heard. Pulmonary/Chest: Effort normal and breath sounds normal.   Abdominal: Soft. She exhibits no mass. There is no splenomegaly or hepatomegaly. There is no tenderness. Musculoskeletal: She exhibits no edema or tenderness. Lymphadenopathy:     She has no cervical adenopathy. Neurological: She is alert and oriented to person, place, and time. Ambulatory. No tremors. Skin: Skin is warm and dry. No rash noted. There is erythema. Right 2nd toe is swollen and laura the full length. Callus/abscess of tip. Not overly painful or tender, but history of neuropathy. Psychiatric: She has a normal mood and affect. Vitals reviewed. Assessment:       Diagnosis Orders   1. Cellulitis and abscess of toe, right  External Referral To Podiatry   2. Hypothyroidism due to acquired atrophy of thyroid  furosemide (LASIX) 40 MG tablet    levothyroxine (SYNTHROID) 100 MCG tablet    potassium chloride (KLOR-CON 10) 10 MEQ extended release tablet   3. Cirrhosis of liver without ascites, unspecified hepatic cirrhosis type (HCC)  furosemide (LASIX) 40 MG tablet    potassium chloride (KLOR-CON 10) 10 MEQ extended release tablet       Plan:       Return in about 6 months (around 10/8/2019) for thyroid, check-up.     Orders Placed:  Orders Placed This Encounter   Procedures    External Referral To Podiatry     Referral Priority:   Routine     Referral Type:   Eval and Treat     Referral Reason:   Specialty Services Required     Requested Specialty:   Podiatry     Number of Visits Requested:   1     Medications Prescribed:  Orders Placed This Encounter   Medications    furosemide (LASIX) 40 MG tablet     Sig: TAKE 1 TABLET EVERY DAY     Dispense:  90 tablet     Refill:  1    levothyroxine (SYNTHROID) 100 MCG tablet     Sig: TAKE 1 TABLET EVERY DAY     Dispense:  90 tablet     Refill:  1    potassium chloride (KLOR-CON 10) 10 MEQ extended release tablet     Sig: Take 3 tablets by mouth daily     Dispense:  270 tablet     Refill:  1    amoxicillin-clavulanate (AUGMENTIN) 500-125 MG per tablet     Sig: Take 1 tablet by mouth 2 times daily for 10 days     Dispense:  20 tablet     Refill:  0         Electronically signed by Maggie Matias 4/8/2019 at 10:34 AM

## 2019-04-08 NOTE — PATIENT INSTRUCTIONS
Patient Education        Hypothyroidism: Care Instructions  Your Care Instructions    You have hypothyroidism, which means that your body is not making enough thyroid hormone. This hormone helps your body use energy. If your thyroid level is low, you may feel tired, be constipated, have an increase in your blood pressure, or have dry skin or memory problems. You may also get cold easily, even when it is warm. Women with low thyroid levels may have heavy menstrual periods. A blood test to find your thyroid-stimulating hormone (TSH) level is used to check for hypothyroidism. A high TSH level may mean that you have low thyroid. When your body is not making enough thyroid hormone, TSH levels rise in an effort to make the body produce more. The treatment for hypothyroidism is to take thyroid hormone pills. You should start to feel better in 1 to 2 weeks. But it can take several months to see changes in the TSH level. You will need regular visits with your doctor to make sure you have the right dose of medicine. Most people need treatment for the rest of their lives. You will need to see your doctor regularly to have blood tests and to make sure you are doing well. Follow-up care is a key part of your treatment and safety. Be sure to make and go to all appointments, and call your doctor if you are having problems. It's also a good idea to know your test results and keep a list of the medicines you take. How can you care for yourself at home? · Take your thyroid hormone medicine exactly as prescribed. Call your doctor if you think you are having a problem with your medicine. Most people do not have side effects if they take the right amount of medicine regularly. ? Take the medicine 30 minutes before breakfast, and do not take it with calcium, vitamins, or iron. ? Do not take extra doses of your thyroid medicine. It will not help you get better any faster, and it may cause side effects.   ? If you forget to take a dose, do NOT take a double dose of medicine. Take your usual dose the next day. · Tell your doctor about all prescription, herbal, or over-the-counter products you take. · Take care of yourself. Eat a healthy diet, get enough sleep, and get regular exercise. When should you call for help? Call 911 anytime you think you may need emergency care. For example, call if:    · You passed out (lost consciousness).     · You have severe trouble breathing.     · You have a very slow heartbeat (less than 60 beats a minute).     · You have a low body temperature (95°F or below).    Call your doctor now or seek immediate medical care if:    · You feel tired, sluggish, or weak.     · You have trouble remembering things or concentrating.     · You do not begin to feel better 2 weeks after starting your medicine.    Watch closely for changes in your health, and be sure to contact your doctor if you have any problems. Where can you learn more? Go to https://Shopparity.[employer]. org and sign in to your Editlite account. Enter K174 in the GT Solar box to learn more about \"Hypothyroidism: Care Instructions. \"     If you do not have an account, please click on the \"Sign Up Now\" link. Current as of: March 14, 2018  Content Version: 11.9  © 8429-3710 [employer]. Care instructions adapted under license by [facility] ([facility]). If you have questions about a medical condition or this instruction, always ask your healthcare professional. [place] [address] any warranty or liability for your use of this information.

## 2019-05-10 ENCOUNTER — HOSPITAL ENCOUNTER (OUTPATIENT)
Dept: MAMMOGRAPHY | Age: 69
Discharge: HOME | End: 2019-05-10
Payer: MEDICARE

## 2019-05-10 DIAGNOSIS — Z12.39 BREAST CANCER SCREENING: ICD-10-CM

## 2019-05-10 PROCEDURE — 77063 BREAST TOMOSYNTHESIS BI: CPT

## 2019-10-07 ENCOUNTER — OFFICE VISIT (OUTPATIENT)
Dept: FAMILY MEDICINE CLINIC | Age: 69
End: 2019-10-07
Payer: MEDICARE

## 2019-10-07 VITALS
BODY MASS INDEX: 28.12 KG/M2 | SYSTOLIC BLOOD PRESSURE: 136 MMHG | HEIGHT: 62 IN | WEIGHT: 152.8 LBS | DIASTOLIC BLOOD PRESSURE: 60 MMHG | HEART RATE: 80 BPM

## 2019-10-07 DIAGNOSIS — Z12.11 SCREENING FOR COLON CANCER: ICD-10-CM

## 2019-10-07 DIAGNOSIS — E03.4 HYPOTHYROIDISM DUE TO ACQUIRED ATROPHY OF THYROID: Primary | ICD-10-CM

## 2019-10-07 DIAGNOSIS — K74.60 CIRRHOSIS OF LIVER WITHOUT ASCITES, UNSPECIFIED HEPATIC CIRRHOSIS TYPE (HCC): ICD-10-CM

## 2019-10-07 DIAGNOSIS — Z00.00 ROUTINE GENERAL MEDICAL EXAMINATION AT A HEALTH CARE FACILITY: ICD-10-CM

## 2019-10-07 LAB
ALBUMIN SERPL-MCNC: 4.3 G/DL (ref 3.5–5.1)
ALP BLD-CCNC: 135 U/L (ref 38–126)
ALT SERPL-CCNC: 123 U/L (ref 11–66)
ANION GAP SERPL CALCULATED.3IONS-SCNC: 13 MEQ/L (ref 8–16)
AST SERPL-CCNC: 171 U/L (ref 5–40)
BILIRUB SERPL-MCNC: 1.2 MG/DL (ref 0.3–1.2)
BUN BLDV-MCNC: 6 MG/DL (ref 7–22)
CALCIUM SERPL-MCNC: 9.5 MG/DL (ref 8.5–10.5)
CHLORIDE BLD-SCNC: 100 MEQ/L (ref 98–111)
CO2: 28 MEQ/L (ref 23–33)
CREAT SERPL-MCNC: 0.6 MG/DL (ref 0.4–1.2)
GFR SERPL CREATININE-BSD FRML MDRD: > 90 ML/MIN/1.73M2
GLUCOSE BLD-MCNC: 93 MG/DL (ref 70–108)
POTASSIUM SERPL-SCNC: 3.9 MEQ/L (ref 3.5–5.2)
SODIUM BLD-SCNC: 141 MEQ/L (ref 135–145)
TOTAL PROTEIN: 7.7 G/DL (ref 6.1–8)
TSH SERPL DL<=0.05 MIU/L-ACNC: 0.32 UIU/ML (ref 0.4–4.2)

## 2019-10-07 PROCEDURE — 4040F PNEUMOC VAC/ADMIN/RCVD: CPT | Performed by: FAMILY MEDICINE

## 2019-10-07 PROCEDURE — G8484 FLU IMMUNIZE NO ADMIN: HCPCS | Performed by: FAMILY MEDICINE

## 2019-10-07 PROCEDURE — 1123F ACP DISCUSS/DSCN MKR DOCD: CPT | Performed by: FAMILY MEDICINE

## 2019-10-07 PROCEDURE — 3017F COLORECTAL CA SCREEN DOC REV: CPT | Performed by: FAMILY MEDICINE

## 2019-10-07 PROCEDURE — G0438 PPPS, INITIAL VISIT: HCPCS | Performed by: FAMILY MEDICINE

## 2019-10-07 RX ORDER — FUROSEMIDE 40 MG/1
TABLET ORAL
Qty: 90 TABLET | Refills: 3 | Status: SHIPPED | OUTPATIENT
Start: 2019-10-07 | End: 2020-05-05 | Stop reason: SDUPTHER

## 2019-10-07 RX ORDER — LEVOTHYROXINE SODIUM 0.1 MG/1
TABLET ORAL
Qty: 90 TABLET | Refills: 3 | Status: SHIPPED | OUTPATIENT
Start: 2019-10-07 | End: 2020-05-05 | Stop reason: SDUPTHER

## 2019-10-07 RX ORDER — POTASSIUM CHLORIDE 750 MG/1
30 TABLET, FILM COATED, EXTENDED RELEASE ORAL DAILY
Qty: 270 TABLET | Refills: 3 | Status: SHIPPED | OUTPATIENT
Start: 2019-10-07 | End: 2020-05-05 | Stop reason: SDUPTHER

## 2019-10-07 ASSESSMENT — LIFESTYLE VARIABLES
HOW MANY STANDARD DRINKS CONTAINING ALCOHOL DO YOU HAVE ON A TYPICAL DAY: 0
HOW OFTEN DO YOU HAVE SIX OR MORE DRINKS ON ONE OCCASION: 0
HOW OFTEN DO YOU HAVE A DRINK CONTAINING ALCOHOL: 1
AUDIT-C TOTAL SCORE: 1

## 2019-10-07 ASSESSMENT — PATIENT HEALTH QUESTIONNAIRE - PHQ9
SUM OF ALL RESPONSES TO PHQ QUESTIONS 1-9: 0
SUM OF ALL RESPONSES TO PHQ QUESTIONS 1-9: 0

## 2019-10-08 DIAGNOSIS — Z12.11 SCREENING FOR COLON CANCER: ICD-10-CM

## 2019-10-08 LAB
CONTROL: PRESENT
HEMOCCULT STL QL: NEGATIVE

## 2019-10-08 PROCEDURE — 82274 ASSAY TEST FOR BLOOD FECAL: CPT | Performed by: FAMILY MEDICINE

## 2020-04-27 RX ORDER — LEVOTHYROXINE SODIUM 0.1 MG/1
TABLET ORAL
Qty: 90 TABLET | Refills: 3 | OUTPATIENT
Start: 2020-04-27

## 2020-04-27 RX ORDER — FUROSEMIDE 40 MG/1
TABLET ORAL
Qty: 90 TABLET | Refills: 3 | OUTPATIENT
Start: 2020-04-27

## 2020-04-27 RX ORDER — POTASSIUM CHLORIDE 750 MG/1
30 TABLET, FILM COATED, EXTENDED RELEASE ORAL DAILY
Qty: 270 TABLET | Refills: 3 | OUTPATIENT
Start: 2020-04-27

## 2020-04-28 ENCOUNTER — VIRTUAL VISIT (OUTPATIENT)
Dept: FAMILY MEDICINE CLINIC | Age: 70
End: 2020-04-28
Payer: MEDICARE

## 2020-04-28 PROCEDURE — 99441 PR PHYS/QHP TELEPHONE EVALUATION 5-10 MIN: CPT | Performed by: FAMILY MEDICINE

## 2020-04-29 ASSESSMENT — PATIENT HEALTH QUESTIONNAIRE - PHQ9
SUM OF ALL RESPONSES TO PHQ9 QUESTIONS 1 & 2: 0
2. FEELING DOWN, DEPRESSED OR HOPELESS: 0
1. LITTLE INTEREST OR PLEASURE IN DOING THINGS: 0
SUM OF ALL RESPONSES TO PHQ QUESTIONS 1-9: 0
SUM OF ALL RESPONSES TO PHQ QUESTIONS 1-9: 0

## 2020-05-05 RX ORDER — LEVOTHYROXINE SODIUM 0.1 MG/1
TABLET ORAL
Qty: 90 TABLET | Refills: 3 | Status: SHIPPED | OUTPATIENT
Start: 2020-05-05 | End: 2021-03-23 | Stop reason: SDUPTHER

## 2020-05-05 RX ORDER — POTASSIUM CHLORIDE 750 MG/1
30 TABLET, FILM COATED, EXTENDED RELEASE ORAL DAILY
Qty: 270 TABLET | Refills: 3 | Status: SHIPPED | OUTPATIENT
Start: 2020-05-05 | End: 2021-03-23 | Stop reason: SDUPTHER

## 2020-05-05 RX ORDER — FUROSEMIDE 40 MG/1
TABLET ORAL
Qty: 90 TABLET | Refills: 3 | Status: SHIPPED | OUTPATIENT
Start: 2020-05-05 | End: 2021-03-23 | Stop reason: SDUPTHER

## 2021-03-10 DIAGNOSIS — E03.4 HYPOTHYROIDISM DUE TO ACQUIRED ATROPHY OF THYROID: ICD-10-CM

## 2021-03-10 DIAGNOSIS — K74.60 CIRRHOSIS OF LIVER WITHOUT ASCITES, UNSPECIFIED HEPATIC CIRRHOSIS TYPE (HCC): ICD-10-CM

## 2021-03-10 RX ORDER — POTASSIUM CHLORIDE 750 MG/1
30 TABLET, FILM COATED, EXTENDED RELEASE ORAL DAILY
Qty: 270 TABLET | Refills: 3 | OUTPATIENT
Start: 2021-03-10

## 2021-03-10 RX ORDER — FUROSEMIDE 40 MG/1
TABLET ORAL
Qty: 90 TABLET | Refills: 3 | OUTPATIENT
Start: 2021-03-10

## 2021-03-10 RX ORDER — LEVOTHYROXINE SODIUM 0.1 MG/1
TABLET ORAL
Qty: 90 TABLET | Refills: 3 | OUTPATIENT
Start: 2021-03-10

## 2021-03-10 NOTE — TELEPHONE ENCOUNTER
Lizy Ruiz called requesting a refill on the following medications:  Requested Prescriptions     Pending Prescriptions Disp Refills    potassium chloride (KLOR-CON 10) 10 MEQ extended release tablet 270 tablet 3     Sig: Take 3 tablets by mouth daily    levothyroxine (SYNTHROID) 100 MCG tablet 90 tablet 3     Sig: TAKE 1 TABLET EVERY DAY    furosemide (LASIX) 40 MG tablet 90 tablet 3     Sig: TAKE 1 TABLET EVERY DAY     Pharmacy verified:YES  .pv      Date of last visit:   Date of next visit (if applicable): Visit date not found

## 2021-03-15 ENCOUNTER — TELEPHONE (OUTPATIENT)
Dept: FAMILY MEDICINE CLINIC | Age: 71
End: 2021-03-15

## 2021-03-15 NOTE — TELEPHONE ENCOUNTER
Patient is calling today because she requested med refills on 3/10 and has not received or heard anything from the provider :    Note     [address] Jelena Hankins called requesting a refill on the following medications:  Requested Prescriptions             Pending Prescriptions Disp Refills    potassium chloride (KLOR-CON 10) 10 MEQ extended release tablet 270 tablet 3       Sig: Take 3 tablets by mouth daily    levothyroxine (SYNTHROID) 100 MCG tablet 90 tablet 3       Sig: TAKE 1 TABLET EVERY DAY    furosemide (LASIX) 40 MG tablet 90 tablet 3       Sig: TAKE 1 TABLET EVERY DAY      Pharmacy verified:YES  .pv        Date of last visit:   Date of next visit (if applicable): Visit date not found                                                      3/10/21 10:20 AM  Milena Amos routed this conversation to [facility] [address] [facility]          Please call the patient to advise

## 2021-03-18 DIAGNOSIS — K74.60 CIRRHOSIS OF LIVER WITHOUT ASCITES, UNSPECIFIED HEPATIC CIRRHOSIS TYPE (HCC): ICD-10-CM

## 2021-03-18 DIAGNOSIS — E03.4 HYPOTHYROIDISM DUE TO ACQUIRED ATROPHY OF THYROID: ICD-10-CM

## 2021-03-18 RX ORDER — FUROSEMIDE 40 MG/1
TABLET ORAL
Qty: 90 TABLET | Refills: 3 | OUTPATIENT
Start: 2021-03-18

## 2021-03-18 RX ORDER — POTASSIUM CHLORIDE 750 MG/1
30 TABLET, FILM COATED, EXTENDED RELEASE ORAL DAILY
Qty: 270 TABLET | Refills: 3 | OUTPATIENT
Start: 2021-03-18

## 2021-03-18 RX ORDER — LEVOTHYROXINE SODIUM 0.1 MG/1
TABLET ORAL
Qty: 90 TABLET | Refills: 3 | OUTPATIENT
Start: 2021-03-18

## 2021-03-18 NOTE — TELEPHONE ENCOUNTER
Med Refill    Refill: Furosemide 40 mg / Levothyroxine 100 mcg/ Potassium 10 MEQ    [facility]      Patient states that she had to cancel her appt due to court appearance. She is just about out of meds.  Please call the patient to advise

## 2021-03-18 NOTE — TELEPHONE ENCOUNTER
Spoke with [place] regarding her need to get Lab work completed prior to refilling meds, also need for her to have a appointment with Dr. Kirkland Innocent , she says she will work on it.

## 2021-03-20 ENCOUNTER — HOSPITAL ENCOUNTER (OUTPATIENT)
Age: 71
Discharge: HOME | End: 2021-03-20
Payer: MEDICARE

## 2021-03-20 DIAGNOSIS — K74.60 CIRRHOSIS OF LIVER WITHOUT ASCITES, UNSPECIFIED HEPATIC CIRRHOSIS TYPE (HCC): ICD-10-CM

## 2021-03-20 DIAGNOSIS — Z00.00 HEALTHCARE MAINTENANCE: ICD-10-CM

## 2021-03-20 DIAGNOSIS — E03.4 HYPOTHYROIDISM DUE TO ACQUIRED ATROPHY OF THYROID: ICD-10-CM

## 2021-03-20 LAB
ALBUMIN SERPL-MCNC: 4.2 G/DL (ref 3.5–5.1)
ALP BLD-CCNC: 104 U/L (ref 38–126)
ALT SERPL-CCNC: 38 U/L (ref 11–66)
ANION GAP SERPL CALCULATED.3IONS-SCNC: 14 MEQ/L (ref 8–16)
AST SERPL-CCNC: 53 U/L (ref 5–40)
BILIRUB SERPL-MCNC: 1.4 MG/DL (ref 0.3–1.2)
BUN BLDV-MCNC: 10 MG/DL (ref 7–22)
CALCIUM SERPL-MCNC: 9.4 MG/DL (ref 8.5–10.5)
CHLORIDE BLD-SCNC: 99 MEQ/L (ref 98–111)
CHOLESTEROL, TOTAL: 164 MG/DL (ref 100–199)
CO2: 27 MEQ/L (ref 23–33)
CREAT SERPL-MCNC: 0.7 MG/DL (ref 0.4–1.2)
GFR SERPL CREATININE-BSD FRML MDRD: 82 ML/MIN/1.73M2
GLUCOSE BLD-MCNC: 102 MG/DL (ref 70–108)
HDLC SERPL-MCNC: 65 MG/DL
LDL CHOLESTEROL CALCULATED: 88 MG/DL
POTASSIUM SERPL-SCNC: 3.9 MEQ/L (ref 3.5–5.2)
SODIUM BLD-SCNC: 140 MEQ/L (ref 135–145)
TOTAL PROTEIN: 8 G/DL (ref 6.1–8)
TRIGL SERPL-MCNC: 57 MG/DL (ref 0–199)
TSH SERPL DL<=0.05 MIU/L-ACNC: 1.67 UIU/ML (ref 0.4–4.2)

## 2021-03-20 PROCEDURE — 80053 COMPREHEN METABOLIC PANEL: CPT

## 2021-03-20 PROCEDURE — 36415 COLL VENOUS BLD VENIPUNCTURE: CPT

## 2021-03-20 PROCEDURE — 80061 LIPID PANEL: CPT

## 2021-03-20 PROCEDURE — 84443 ASSAY THYROID STIM HORMONE: CPT

## 2021-03-22 ENCOUNTER — TELEPHONE (OUTPATIENT)
Dept: FAMILY MEDICINE CLINIC | Age: 71
End: 2021-03-22

## 2021-03-22 NOTE — PROGRESS NOTES
[address] [place] [address] [address], [address]  Phone:  [phone]  YUV:[phone]    Medicare Annual Wellness Visit    Name: Wanda Cobian Date: 3/23/2021   MRN: [id number] Sex: Female   Age: 65 y.o. Ethnicity: Non-/Non    : 1950 Race: Denise Franks is here for Medicare AWV    She is a retired nun. She used to have a [employer] in [place] so is used to having 2 jobs. 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 Screenings for behavioral, psychosocial and functional/safety risks, and cognitive dysfunction are all negative except as indicated below. These results, as well as other patient data from the [address] [facility] [address] form, are documented in Flowsheets linked to this Encounter. No Known Allergies      Prior to Visit Medications    Medication Sig Taking? Authorizing Provider   furosemide (LASIX) 40 MG tablet TAKE 1 TABLET EVERY DAY Yes Tony Gutierrez MD   levothyroxine (SYNTHROID) 100 MCG tablet TAKE 1 TABLET EVERY DAY Yes Tony Gutierrez MD   potassium chloride (KLOR-CON 10) 10 MEQ extended release tablet Take 3 tablets by mouth daily Yes Tony Gutierrez MD         Past Medical History:   Diagnosis Date    DDD (degenerative disc disease), lumbar 10/27/2016    Hepatitis C     Hypothyroidism     Neuromuscular disorder [facility])     Psychiatric problem        Past Surgical History:   Procedure Laterality Date    EYE SURGERY  ? ?    cataract, bilateral    FEMUR SURGERY Left 2017    Revision    FRACTURE SURGERY      right hip fracture    JOINT REPLACEMENT      left hip replacement    TOE AMPUTATION Right 2016    4th    TONSILLECTOMY      as a child         Family History   Problem Relation Age of Onset    Stroke Mother     Arthritis Mother     Hearing Loss Mother     Arthritis Father     High Blood Pressure Father     Kidney Disease Father    Serrano Diabetes Brother     High Cholesterol Brother        CareTeam (Including outside providers/suppliers regularly involved in providing care):   Patient Care Team:  Tay Kenny MD as PCP - General (Family Medicine)  Tay Kenny MD as PCP - [facility]    Wt Readings from Last 3 Encounters:   03/23/21 154 lb (69.9 kg)   10/07/19 152 lb 12.8 oz (69.3 kg)   04/08/19 155 lb 9.6 oz (70.6 kg)     Vitals:    03/23/21 1535   BP: 118/74   Site: Left Upper Arm   Position: Sitting   Cuff Size: Large Adult   Pulse: 85   Temp: 96.8 °F (36 °C)   SpO2: 98%   Weight: 154 lb (69.9 kg)   Height: 5' 2\" (1.575 m)     Body mass index is 28.17 kg/m². Based upon direct observation of the patient, evaluation of cognition reveals recent and remote memory intact. General Appearance: alert and oriented to person, place and time, well developed and well- nourished, in no acute distress  Skin: warm and dry, no rash or erythema  Head: normocephalic and atraumatic  Eyes: extraocular eye movements intact, conjunctivae normal  ENT: tympanic membrane, external ear and ear canal normal bilaterally, nose without deformity, nasal mucosa and turbinates normal without polyps  Neck: supple and non-tender without mass, no thyromegaly or thyroid nodules, no cervical lymphadenopathy  Pulmonary/Chest: clear to auscultation bilaterally- no wheezes, rales or rhonchi, normal air movement, no respiratory distress  Cardiovascular: normal rate, regular rhythm, normal S1 and S2, no murmurs, rubs, clicks, or gallops, distal pulses intact, no carotid bruits  Abdomen: soft, non-tender, non-distended, normal bowel sounds, no masses or organomegaly    Patient's complete Health Risk Assessment and screening values have been reviewed and are found in Flowsheets. The following problems were reviewed today and where indicated follow up appointments were made and/or referrals ordered.     Positive Risk Factor Screenings with Interventions: No Positive Risk Factors identified today. Personalized Preventive Plan   Current Health Maintenance Status  Immunization History   Administered Date(s) Administered    PPD Test 01/10/2013    Pneumococcal Conjugate 13-valent (Anay Boys) 04/28/2017        Health Maintenance   Topic Date Due    Hepatitis A vaccine (1 of 2 - Risk 2-dose series) Never done    COVID-19 Vaccine (1) Never done    Hepatitis B vaccine (1 of 3 - Risk 3-dose series) Never done    DTaP/Tdap/Td vaccine (1 - Tdap) Never done    Shingles Vaccine (1 of 2) Never done    Pneumococcal 65+ years Vaccine (2 of 2 - PPSV23) 04/28/2018    Annual Wellness Visit (AWV)  Never done    Flu vaccine (1) Never done    Colon Cancer Screen FIT/FOBT  10/08/2020    Breast cancer screen  05/10/2021    TSH testing  03/20/2022    Potassium monitoring  03/20/2022    Creatinine monitoring  03/20/2022    Lipid screen  03/20/2026    DEXA (modify frequency per FRAX score)  Addressed    Hib vaccine  Aged Out    Meningococcal (ACWY) vaccine  Aged Out     Recommendations for [employer]ower Due: see orders and patient instructions/AVS.    Recommended screening schedule for the next 5-10 years is provided to the patient in written form: see Patient Ellen Jackson was seen today for medicare awv. Diagnoses and all orders for this visit:    Encounter for Medicare annual wellness exam        -     Routine health maintenance screening was reviewed as above. Hypothyroidism due to acquired atrophy of thyroid  -     Recent TSH was reviewed so will continue on current dose of Levothyroxine.  -     levothyroxine (SYNTHROID) 100 MCG tablet; TAKE 1 TABLET EVERY DAY    Cirrhosis of liver without ascites, unspecified hepatic cirrhosis type (HCC)  -     furosemide (LASIX) 40 MG tablet; TAKE 1 TABLET EVERY DAY  -     potassium chloride (KLOR-CON 10) 10 MEQ extended release tablet;  Take 3 tablets by mouth daily    Screening for colon cancer        -     FIT test was provided. Electronically signed by Monae Rousseau MD on 3/23/21.

## 2021-03-22 NOTE — TELEPHONE ENCOUNTER
----- Message from Danilo Call MD sent at 3/21/2021  1:23 PM EDT -----  Labs were overall fine. Patient needs an appointment as she hasn't been seen since April 2020. Please schedule as AWV.

## 2021-03-23 ENCOUNTER — OFFICE VISIT (OUTPATIENT)
Dept: FAMILY MEDICINE CLINIC | Age: 71
End: 2021-03-23
Payer: MEDICARE

## 2021-03-23 VITALS
OXYGEN SATURATION: 98 % | BODY MASS INDEX: 28.34 KG/M2 | HEIGHT: 62 IN | DIASTOLIC BLOOD PRESSURE: 74 MMHG | TEMPERATURE: 96.8 F | WEIGHT: 154 LBS | SYSTOLIC BLOOD PRESSURE: 118 MMHG | HEART RATE: 85 BPM

## 2021-03-23 DIAGNOSIS — Z00.00 ENCOUNTER FOR MEDICARE ANNUAL WELLNESS EXAM: Primary | ICD-10-CM

## 2021-03-23 DIAGNOSIS — E03.4 HYPOTHYROIDISM DUE TO ACQUIRED ATROPHY OF THYROID: ICD-10-CM

## 2021-03-23 DIAGNOSIS — Z12.11 SCREENING FOR COLON CANCER: ICD-10-CM

## 2021-03-23 DIAGNOSIS — K74.60 CIRRHOSIS OF LIVER WITHOUT ASCITES, UNSPECIFIED HEPATIC CIRRHOSIS TYPE (HCC): ICD-10-CM

## 2021-03-23 PROCEDURE — 1123F ACP DISCUSS/DSCN MKR DOCD: CPT | Performed by: FAMILY MEDICINE

## 2021-03-23 PROCEDURE — G8484 FLU IMMUNIZE NO ADMIN: HCPCS | Performed by: FAMILY MEDICINE

## 2021-03-23 PROCEDURE — 3017F COLORECTAL CA SCREEN DOC REV: CPT | Performed by: FAMILY MEDICINE

## 2021-03-23 PROCEDURE — G0439 PPPS, SUBSEQ VISIT: HCPCS | Performed by: FAMILY MEDICINE

## 2021-03-23 PROCEDURE — 4040F PNEUMOC VAC/ADMIN/RCVD: CPT | Performed by: FAMILY MEDICINE

## 2021-03-23 RX ORDER — POTASSIUM CHLORIDE 750 MG/1
30 TABLET, FILM COATED, EXTENDED RELEASE ORAL DAILY
Qty: 270 TABLET | Refills: 1 | Status: SHIPPED | OUTPATIENT
Start: 2021-03-23 | End: 2021-08-28 | Stop reason: SDUPTHER

## 2021-03-23 RX ORDER — LEVOTHYROXINE SODIUM 0.1 MG/1
TABLET ORAL
Qty: 90 TABLET | Refills: 1 | Status: SHIPPED | OUTPATIENT
Start: 2021-03-23 | End: 2021-08-28 | Stop reason: SDUPTHER

## 2021-03-23 RX ORDER — FUROSEMIDE 40 MG/1
TABLET ORAL
Qty: 90 TABLET | Refills: 1 | Status: SHIPPED | OUTPATIENT
Start: 2021-03-23 | End: 2021-08-28 | Stop reason: SDUPTHER

## 2021-03-23 ASSESSMENT — LIFESTYLE VARIABLES: HOW OFTEN DO YOU HAVE A DRINK CONTAINING ALCOHOL: 0

## 2021-03-23 ASSESSMENT — PATIENT HEALTH QUESTIONNAIRE - PHQ9
SUM OF ALL RESPONSES TO PHQ9 QUESTIONS 1 & 2: 0
1. LITTLE INTEREST OR PLEASURE IN DOING THINGS: 0
SUM OF ALL RESPONSES TO PHQ QUESTIONS 1-9: 0

## 2021-03-23 NOTE — PATIENT INSTRUCTIONS
Personalized Preventive Plan for [place] - 3/23/2021  Medicare offers a range of preventive health benefits. Some of the tests and screenings are paid in full while other may be subject to a deductible, co-insurance, and/or copay. Some of these benefits include a comprehensive review of your medical history including lifestyle, illnesses that may run in your family, and various assessments and screenings as appropriate. After reviewing your medical record and screening and assessments performed today your provider may have ordered immunizations, labs, imaging, and/or referrals for you. A list of these orders (if applicable) as well as your Preventive Care list are included within your After Visit Summary for your review. Other Preventive Recommendations:    · A preventive eye exam performed by an eye specialist is recommended every 1-2 years to screen for glaucoma; cataracts, macular degeneration, and other eye disorders. · A preventive dental visit is recommended every 6 months. · Try to get at least 150 minutes of exercise per week or 10,000 steps per day on a pedometer . · Order or download the FREE \"Exercise & Physical Activity: Your Everyday Guide\" from [employer]. Call 0-233.645.9709 or search [employer] online. · You need 7238-7200 mg of calcium and 5405-7152 IU of vitamin D per day. It is possible to meet your calcium requirement with diet alone, but a vitamin D supplement is usually necessary to meet this goal.  · When exposed to the sun, use a sunscreen that protects against both UVA and UVB radiation with an SPF of 30 or greater. Reapply every 2 to 3 hours or after sweating, drying off with a towel, or swimming. · Always wear a seat belt when traveling in a car. Always wear a helmet when riding a bicycle or motorcycle.   Personalized Preventive Plan for [place] - 3/23/2021  Medicare offers a range of preventive health benefits. Some of the tests and screenings are paid in full while other may be subject to a deductible, co-insurance, and/or copay. Some of these benefits include a comprehensive review of your medical history including lifestyle, illnesses that may run in your family, and various assessments and screenings as appropriate. After reviewing your medical record and screening and assessments performed today your provider may have ordered immunizations, labs, imaging, and/or referrals for you. A list of these orders (if applicable) as well as your Preventive Care list are included within your After Visit Summary for your review. Other Preventive Recommendations:    A preventive eye exam performed by an eye specialist is recommended every 1-2 years to screen for glaucoma; cataracts, macular degeneration, and other eye disorders. A preventive dental visit is recommended every 6 months. Try to get at least 150 minutes of exercise per week or 10,000 steps per day on a pedometer . Order or download the FREE \"Exercise & Physical Activity: Your Everyday Guide\" from [employer]. Call 1-555.239.2285 or search [employer] online. You need 0080-3411 mg of calcium and 7642-9716 IU of vitamin D per day. It is possible to meet your calcium requirement with diet alone, but a vitamin D supplement is usually necessary to meet this goal.  When exposed to the sun, use a sunscreen that protects against both UVA and UVB radiation with an SPF of 30 or greater. Reapply every 2 to 3 hours or after sweating, drying off with a towel, or swimming. Always wear a seat belt when traveling in a car. Always wear a helmet when riding a bicycle or motorcycle.

## 2021-03-24 DIAGNOSIS — Z12.11 SCREENING FOR COLON CANCER: Primary | ICD-10-CM

## 2021-03-24 LAB
CONTROL: PRESENT
HEMOCCULT STL QL: NEGATIVE

## 2021-03-24 PROCEDURE — 82274 ASSAY TEST FOR BLOOD FECAL: CPT | Performed by: FAMILY MEDICINE

## 2021-03-25 ENCOUNTER — TELEPHONE (OUTPATIENT)
Dept: FAMILY MEDICINE CLINIC | Age: 71
End: 2021-03-25

## 2021-07-09 ENCOUNTER — HOSPITAL ENCOUNTER (OUTPATIENT)
Dept: MAMMOGRAPHY | Age: 71
Discharge: HOME | End: 2021-07-09
Payer: MEDICARE

## 2021-07-09 DIAGNOSIS — Z12.31 VISIT FOR SCREENING MAMMOGRAM: ICD-10-CM

## 2021-07-09 PROCEDURE — 77067 SCR MAMMO BI INCL CAD: CPT

## 2021-07-22 ENCOUNTER — HOSPITAL ENCOUNTER (OUTPATIENT)
Dept: WOMENS IMAGING | Age: 71
Discharge: HOME | End: 2021-07-22
Payer: MEDICARE

## 2021-07-22 DIAGNOSIS — R92.2 BREAST DENSITY: ICD-10-CM

## 2021-07-22 PROCEDURE — 76642 ULTRASOUND BREAST LIMITED: CPT

## 2021-07-22 PROCEDURE — G0279 TOMOSYNTHESIS, MAMMO: HCPCS

## 2021-07-27 ENCOUNTER — TELEPHONE (OUTPATIENT)
Dept: FAMILY MEDICINE CLINIC | Age: 71
End: 2021-07-27

## 2021-07-27 DIAGNOSIS — M51.36 DDD (DEGENERATIVE DISC DISEASE), LUMBAR: Primary | ICD-10-CM

## 2021-07-27 NOTE — TELEPHONE ENCOUNTER
[address] Hankins called requesting a refill on the following medications:  Requested Prescriptions     Pending Prescriptions Disp Refills    Handicap Placard [facility] 1 each 0     Sig: by Does not apply route        *Patient would like mailed to her once signed        Thanks,  Beck Medrano [facility] ([address] [place] [address])

## 2021-07-30 DIAGNOSIS — M51.36 DDD (DEGENERATIVE DISC DISEASE), LUMBAR: ICD-10-CM

## 2021-08-02 ENCOUNTER — TELEPHONE (OUTPATIENT)
Dept: FAMILY MEDICINE CLINIC | Age: 71
End: 2021-08-02

## 2021-08-05 ENCOUNTER — TELEPHONE (OUTPATIENT)
Dept: FAMILY MEDICINE CLINIC | Age: 71
End: 2021-08-05

## 2021-08-05 NOTE — TELEPHONE ENCOUNTER
[place] calls looking for her Hand-Cap script, advised it was sent in mail on 07/27/21. She will call if not received.

## 2021-08-24 ENCOUNTER — TELEPHONE (OUTPATIENT)
Dept: FAMILY MEDICINE CLINIC | Age: 71
End: 2021-08-24

## 2021-08-24 DIAGNOSIS — E03.4 HYPOTHYROIDISM DUE TO ACQUIRED ATROPHY OF THYROID: ICD-10-CM

## 2021-08-24 DIAGNOSIS — K74.60 CIRRHOSIS OF LIVER WITHOUT ASCITES, UNSPECIFIED HEPATIC CIRRHOSIS TYPE (HCC): ICD-10-CM

## 2021-08-24 NOTE — TELEPHONE ENCOUNTER
Mearl Riedel called requesting a refill on the following medications:  Requested Prescriptions     Pending Prescriptions Disp Refills    potassium chloride (KLOR-CON 10) 10 MEQ extended release tablet 270 tablet 1     Sig: Take 3 tablets by mouth daily    levothyroxine (SYNTHROID) 100 MCG tablet 90 tablet 1     Sig: TAKE 1 TABLET EVERY DAY    furosemide (LASIX) 40 MG tablet 90 tablet 1     Sig: TAKE 1 TABLET EVERY DAY       Date of last visit: 3/23/2021  Date of next visit (if applicable):Visit date not found  Date of last refill: 03/23/21  Pharmacy Name: Ann[facility] LPN

## 2021-08-24 NOTE — TELEPHONE ENCOUNTER
[place] needs a Handi- cap script dated from today's date thru October of 2022. The one we did earlier did not have a end date.  Thanks

## 2021-08-27 DIAGNOSIS — M51.36 DDD (DEGENERATIVE DISC DISEASE), LUMBAR: Primary | ICD-10-CM

## 2021-08-28 RX ORDER — LEVOTHYROXINE SODIUM 0.1 MG/1
TABLET ORAL
Qty: 90 TABLET | Refills: 1 | Status: SHIPPED | OUTPATIENT
Start: 2021-08-28 | End: 2022-02-15 | Stop reason: SDUPTHER

## 2021-08-28 RX ORDER — FUROSEMIDE 40 MG/1
TABLET ORAL
Qty: 90 TABLET | Refills: 1 | Status: SHIPPED | OUTPATIENT
Start: 2021-08-28 | End: 2022-02-15 | Stop reason: SDUPTHER

## 2021-08-28 RX ORDER — POTASSIUM CHLORIDE 750 MG/1
30 TABLET, FILM COATED, EXTENDED RELEASE ORAL DAILY
Qty: 270 TABLET | Refills: 1 | Status: SHIPPED | OUTPATIENT
Start: 2021-08-28 | End: 2022-02-15 | Stop reason: SDUPTHER

## 2022-02-15 ENCOUNTER — OFFICE VISIT (OUTPATIENT)
Dept: FAMILY MEDICINE CLINIC | Age: 72
End: 2022-02-15
Payer: MEDICARE

## 2022-02-15 VITALS
OXYGEN SATURATION: 99 % | SYSTOLIC BLOOD PRESSURE: 122 MMHG | DIASTOLIC BLOOD PRESSURE: 72 MMHG | HEART RATE: 83 BPM | WEIGHT: 146.6 LBS | TEMPERATURE: 97 F | HEIGHT: 62 IN | BODY MASS INDEX: 26.98 KG/M2

## 2022-02-15 DIAGNOSIS — E03.4 HYPOTHYROIDISM DUE TO ACQUIRED ATROPHY OF THYROID: ICD-10-CM

## 2022-02-15 DIAGNOSIS — K74.60 CIRRHOSIS OF LIVER WITHOUT ASCITES, UNSPECIFIED HEPATIC CIRRHOSIS TYPE (HCC): ICD-10-CM

## 2022-02-15 PROCEDURE — 99214 OFFICE O/P EST MOD 30 MIN: CPT | Performed by: FAMILY MEDICINE

## 2022-02-15 RX ORDER — POTASSIUM CHLORIDE 750 MG/1
30 TABLET, FILM COATED, EXTENDED RELEASE ORAL DAILY
Qty: 270 TABLET | Refills: 2 | Status: SHIPPED | OUTPATIENT
Start: 2022-02-15 | End: 2022-04-25 | Stop reason: SDUPTHER

## 2022-02-15 RX ORDER — LEVOTHYROXINE SODIUM 0.1 MG/1
TABLET ORAL
Qty: 90 TABLET | Refills: 2 | Status: SHIPPED | OUTPATIENT
Start: 2022-02-15 | End: 2022-04-25 | Stop reason: SDUPTHER

## 2022-02-15 RX ORDER — FUROSEMIDE 40 MG/1
TABLET ORAL
Qty: 90 TABLET | Refills: 2 | Status: SHIPPED | OUTPATIENT
Start: 2022-02-15 | End: 2022-04-25 | Stop reason: SDUPTHER

## 2022-02-15 SDOH — ECONOMIC STABILITY: FOOD INSECURITY: WITHIN THE PAST 12 MONTHS, THE FOOD YOU BOUGHT JUST DIDN'T LAST AND YOU DIDN'T HAVE MONEY TO GET MORE.: NEVER TRUE

## 2022-02-15 SDOH — ECONOMIC STABILITY: FOOD INSECURITY: WITHIN THE PAST 12 MONTHS, YOU WORRIED THAT YOUR FOOD WOULD RUN OUT BEFORE YOU GOT MONEY TO BUY MORE.: NEVER TRUE

## 2022-02-15 ASSESSMENT — SOCIAL DETERMINANTS OF HEALTH (SDOH): HOW HARD IS IT FOR YOU TO PAY FOR THE VERY BASICS LIKE FOOD, HOUSING, MEDICAL CARE, AND HEATING?: NOT HARD AT ALL

## 2022-02-15 NOTE — PROGRESS NOTES
Jun Avitia (:  1950) is a 66 y.o. female,Established patient, here for evaluation of the following chief complaint(s):  Establishing with doctor     ASSESSMENT/PLAN:  1. Cirrhosis of liver without ascites, unspecified hepatic cirrhosis type (HCC)  -     potassium chloride (KLOR-CON 10) 10 MEQ extended release tablet; Take 3 tablets by mouth daily, Disp-270 tablet, R-2Normal  -     furosemide (LASIX) 40 MG tablet; TAKE 1 TABLET EVERY DAY, Disp-90 tablet, R-2Normal  -     Comprehensive Metabolic Panel; Future  -     CBC Auto Differential; Future  -     Lipid Panel; Future  -     TSH with Reflex; Future  -     Hemoglobin A1C; Future  -     US LIVER; Future  -     Gamma GT; Future  -     Protime-INR; Future  -     APTT; Future  2. Hypothyroidism due to acquired atrophy of thyroid  -     levothyroxine (SYNTHROID) 100 MCG tablet; TAKE 1 TABLET EVERY DAY, Disp-90 tablet, R-2Normal  -     Comprehensive Metabolic Panel; Future  -     CBC Auto Differential; Future  -     Lipid Panel; Future  -     TSH with Reflex; Future  -     Hemoglobin A1C; Future  -     US LIVER; Future  -     Gamma GT; Future  -     Protime-INR; Future  -     APTT; Future      Consider Vitamin B12 and folate check. Return in about 6 months (around 8/15/2022). Subjective   SUBJECTIVE/OBJECTIVE:  HPI    We're meeting for first time. Previously followed with Dr. Jed Moore and Dr. Mariah Carmona. Doing overall pretty well. 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 Thyroid trouble for 20s year, in her mid 46s when started. Feels things are stable. I noted some Weight loss. She believes this is due to a more balanced diet. Liver disease due to Hep C, treated but not what sure what year. H/o Hep C and liver cirrhosis. Has been stable. Not doing ETOH. 8-10 years last check. No f/u with GI.      right hip, tejas and 4 pins.    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 Latest known visit with results is:   Orders Only on 03/24/2021   Component Date Value Ref Range Status    OCCULT BLOOD FECAL 03/24/2021 Negative   Final    Control 03/24/2021 Present   Final          An electronic signature was used to authenticate this note.     --Farhan Begum MD

## 2022-02-16 ENCOUNTER — HOSPITAL ENCOUNTER (OUTPATIENT)
Age: 72
Discharge: HOME | End: 2022-02-16
Payer: MEDICARE

## 2022-02-16 DIAGNOSIS — E03.4 HYPOTHYROIDISM DUE TO ACQUIRED ATROPHY OF THYROID: ICD-10-CM

## 2022-02-16 DIAGNOSIS — K74.60 CIRRHOSIS OF LIVER WITHOUT ASCITES, UNSPECIFIED HEPATIC CIRRHOSIS TYPE (HCC): ICD-10-CM

## 2022-02-16 LAB
ALBUMIN SERPL-MCNC: 4.2 G/DL (ref 3.5–5.1)
ALP BLD-CCNC: 101 U/L (ref 38–126)
ALT SERPL-CCNC: 37 U/L (ref 11–66)
ANION GAP SERPL CALCULATED.3IONS-SCNC: 11 MEQ/L (ref 8–16)
APTT: 33.6 SECONDS (ref 22–38)
AST SERPL-CCNC: 44 U/L (ref 5–40)
AVERAGE GLUCOSE: 99 MG/DL (ref 70–126)
BASOPHILS # BLD: 1 %
BASOPHILS ABSOLUTE: 0 THOU/MM3 (ref 0–0.1)
BILIRUB SERPL-MCNC: 0.7 MG/DL (ref 0.3–1.2)
BUN BLDV-MCNC: 9 MG/DL (ref 7–22)
CALCIUM SERPL-MCNC: 9.4 MG/DL (ref 8.5–10.5)
CHLORIDE BLD-SCNC: 105 MEQ/L (ref 98–111)
CHOLESTEROL, TOTAL: 152 MG/DL (ref 100–199)
CO2: 26 MEQ/L (ref 23–33)
CREAT SERPL-MCNC: 0.7 MG/DL (ref 0.4–1.2)
EOSINOPHIL # BLD: 2.6 %
EOSINOPHILS ABSOLUTE: 0.1 THOU/MM3 (ref 0–0.4)
ERYTHROCYTE [DISTWIDTH] IN BLOOD BY AUTOMATED COUNT: 13.6 % (ref 11.5–14.5)
ERYTHROCYTE [DISTWIDTH] IN BLOOD BY AUTOMATED COUNT: 49 FL (ref 35–45)
GAMMA GLUTAMYL TRANSFERASE: 38 U/L (ref 8–69)
GFR SERPL CREATININE-BSD FRML MDRD: 82 ML/MIN/1.73M2
GLUCOSE BLD-MCNC: 91 MG/DL (ref 70–108)
HBA1C MFR BLD: 5.3 % (ref 4.4–6.4)
HCT VFR BLD CALC: 43 % (ref 37–47)
HDLC SERPL-MCNC: 47 MG/DL
HEMOGLOBIN: 13.4 GM/DL (ref 12–16)
IMMATURE GRANS (ABS): 0.01 THOU/MM3 (ref 0–0.07)
IMMATURE GRANULOCYTES: 0.2 %
INR BLD: 0.96 (ref 0.85–1.13)
LDL CHOLESTEROL CALCULATED: 84 MG/DL
LYMPHOCYTES # BLD: 42.4 %
LYMPHOCYTES ABSOLUTE: 1.8 THOU/MM3 (ref 1–4.8)
MCH RBC QN AUTO: 30.3 PG (ref 26–33)
MCHC RBC AUTO-ENTMCNC: 31.2 GM/DL (ref 32.2–35.5)
MCV RBC AUTO: 97.3 FL (ref 81–99)
MONOCYTES # BLD: 10.6 %
MONOCYTES ABSOLUTE: 0.4 THOU/MM3 (ref 0.4–1.3)
NUCLEATED RED BLOOD CELLS: 0 /100 WBC
PLATELET # BLD: 118 THOU/MM3 (ref 130–400)
PMV BLD AUTO: 11.1 FL (ref 9.4–12.4)
POTASSIUM SERPL-SCNC: 4.9 MEQ/L (ref 3.5–5.2)
RBC # BLD: 4.42 MILL/MM3 (ref 4.2–5.4)
SEG NEUTROPHILS: 43.2 %
SEGMENTED NEUTROPHILS ABSOLUTE COUNT: 1.8 THOU/MM3 (ref 1.8–7.7)
SODIUM BLD-SCNC: 142 MEQ/L (ref 135–145)
TOTAL PROTEIN: 7 G/DL (ref 6.1–8)
TRIGL SERPL-MCNC: 106 MG/DL (ref 0–199)
WBC # BLD: 4.2 THOU/MM3 (ref 4.8–10.8)

## 2022-02-16 PROCEDURE — 80061 LIPID PANEL: CPT

## 2022-02-16 PROCEDURE — 85610 PROTHROMBIN TIME: CPT

## 2022-02-16 PROCEDURE — 80053 COMPREHEN METABOLIC PANEL: CPT

## 2022-02-16 PROCEDURE — 82977 ASSAY OF GGT: CPT

## 2022-02-16 PROCEDURE — 83036 HEMOGLOBIN GLYCOSYLATED A1C: CPT

## 2022-02-16 PROCEDURE — 85025 COMPLETE CBC W/AUTO DIFF WBC: CPT

## 2022-02-16 PROCEDURE — 85730 THROMBOPLASTIN TIME PARTIAL: CPT

## 2022-02-16 PROCEDURE — 36415 COLL VENOUS BLD VENIPUNCTURE: CPT

## 2022-02-20 ASSESSMENT — ENCOUNTER SYMPTOMS: SHORTNESS OF BREATH: 0

## 2022-02-21 ENCOUNTER — TELEPHONE (OUTPATIENT)
Dept: FAMILY MEDICINE CLINIC | Age: 72
End: 2022-02-21

## 2022-02-21 NOTE — TELEPHONE ENCOUNTER
----- Message from Jasmin Lao MD sent at 2/21/2022  5:52 AM EST -----  Please let patient know that her labs show mildly elevated liver enzymes and mildly low platelets. This is consistent with her liver disease and we'll want to follow this closely. The kidney function, clotting function, other blood cells, sugar and cholesterol are within acceptable limits. We'll await liver ultrasound results for next steps.

## 2022-02-21 NOTE — RESULT ENCOUNTER NOTE
Please let patient know that her labs show mildly elevated liver enzymes and mildly low platelets. This is consistent with her liver disease and we'll want to follow this closely. The kidney function, clotting function, other blood cells, sugar and cholesterol are within acceptable limits. We'll await liver ultrasound results for next steps.

## 2022-02-21 NOTE — TELEPHONE ENCOUNTER
Called and notified patient of lab results. She has verbalized an understanding and is scheduled for US on Wednesday.

## 2022-02-23 ENCOUNTER — HOSPITAL ENCOUNTER (OUTPATIENT)
Dept: ULTRASOUND IMAGING | Age: 72
Discharge: HOME | End: 2022-02-23
Payer: MEDICARE

## 2022-02-23 ENCOUNTER — TELEPHONE (OUTPATIENT)
Dept: FAMILY MEDICINE CLINIC | Age: 72
End: 2022-02-23

## 2022-02-23 DIAGNOSIS — K74.60 CIRRHOSIS OF LIVER WITHOUT ASCITES, UNSPECIFIED HEPATIC CIRRHOSIS TYPE (HCC): ICD-10-CM

## 2022-02-23 DIAGNOSIS — E03.4 HYPOTHYROIDISM DUE TO ACQUIRED ATROPHY OF THYROID: ICD-10-CM

## 2022-02-23 PROCEDURE — 76705 ECHO EXAM OF ABDOMEN: CPT

## 2022-02-23 NOTE — RESULT ENCOUNTER NOTE
Please let patient know that her liver ultrasound confirms cirrhosis. No tumors/masses noted. Yearly check is recommended due to higher risk of liver cancer in this setting.

## 2022-02-23 NOTE — TELEPHONE ENCOUNTER
----- Message from Omayra Bhagat MD sent at 2/23/2022  1:03 PM EST -----  Please let patient know that her liver ultrasound confirms cirrhosis. No tumors/masses noted. Yearly check is recommended due to higher risk of liver cancer in this setting.

## 2022-04-22 ENCOUNTER — HOSPITAL ENCOUNTER (OUTPATIENT)
Age: 72
Discharge: HOME | End: 2022-04-22
Payer: COMMERCIAL

## 2022-04-22 DIAGNOSIS — K74.60 CIRRHOSIS OF LIVER WITHOUT ASCITES, UNSPECIFIED HEPATIC CIRRHOSIS TYPE (HCC): ICD-10-CM

## 2022-04-22 DIAGNOSIS — E03.4 HYPOTHYROIDISM DUE TO ACQUIRED ATROPHY OF THYROID: ICD-10-CM

## 2022-04-22 LAB
T4 FREE: 1.87 NG/DL (ref 0.93–1.76)
TSH SERPL DL<=0.05 MIU/L-ACNC: 0.14 UIU/ML (ref 0.4–4.2)

## 2022-04-22 PROCEDURE — 36415 COLL VENOUS BLD VENIPUNCTURE: CPT

## 2022-04-22 PROCEDURE — 84443 ASSAY THYROID STIM HORMONE: CPT

## 2022-04-22 PROCEDURE — 84439 ASSAY OF FREE THYROXINE: CPT

## 2022-04-25 DIAGNOSIS — K74.60 CIRRHOSIS OF LIVER WITHOUT ASCITES, UNSPECIFIED HEPATIC CIRRHOSIS TYPE (HCC): ICD-10-CM

## 2022-04-25 DIAGNOSIS — E03.4 HYPOTHYROIDISM DUE TO ACQUIRED ATROPHY OF THYROID: ICD-10-CM

## 2022-04-25 RX ORDER — LEVOTHYROXINE SODIUM 0.1 MG/1
TABLET ORAL
Qty: 90 TABLET | Refills: 2 | Status: SHIPPED | OUTPATIENT
Start: 2022-04-25 | End: 2022-08-16 | Stop reason: SDUPTHER

## 2022-04-25 RX ORDER — POTASSIUM CHLORIDE 750 MG/1
30 TABLET, FILM COATED, EXTENDED RELEASE ORAL DAILY
Qty: 270 TABLET | Refills: 2 | Status: SHIPPED | OUTPATIENT
Start: 2022-04-25 | End: 2022-08-16 | Stop reason: SDUPTHER

## 2022-04-25 RX ORDER — FUROSEMIDE 40 MG/1
TABLET ORAL
Qty: 90 TABLET | Refills: 2 | Status: SHIPPED | OUTPATIENT
Start: 2022-04-25 | End: 2022-08-16 | Stop reason: SDUPTHER

## 2022-04-25 NOTE — TELEPHONE ENCOUNTER
Nani Trejo called requesting a refill on the following medications:  Requested Prescriptions     Pending Prescriptions Disp Refills    potassium chloride (KLOR-CON 10) 10 MEQ extended release tablet 270 tablet 2     Sig: Take 3 tablets by mouth daily    levothyroxine (SYNTHROID) 100 MCG tablet 90 tablet 2     Sig: TAKE 1 TABLET EVERY DAY    furosemide (LASIX) 40 MG tablet 90 tablet 2     Sig: TAKE 1 TABLET EVERY DAY       Date of last visit: 2/15/2022  Date of next visit (if applicable):8/16/2022  Date of last refill:   Pharmacy Name: [facility]    >Patient needs to switch pharmacies per Insurance.       Thanks,  Te Valencia, CMA

## 2022-04-25 NOTE — TELEPHONE ENCOUNTER
Patient called in she needs a refill of 3 medications the first one is POTASSIUM 10 MG the second one Is LEVOTHYROXINE 100 MG TABLET and the last one is FUROSEMIDE 40 MG TABLET  Please send all of those to THE [facility] pharmacy mail in order.

## 2022-04-27 ENCOUNTER — TELEPHONE (OUTPATIENT)
Dept: FAMILY MEDICINE CLINIC | Age: 72
End: 2022-04-27

## 2022-04-27 NOTE — TELEPHONE ENCOUNTER
----- Message from Kiley Neff sent at 4/27/2022  8:15 AM EDT -----  Subject: Message to Provider    QUESTIONS  Information for Provider? pt called and is asking office to call [facility]   a call to confirm pt rx refills pt states they called her yesterday   however due to bad reception they were not able to hear each other pt is   asking for office to call [facility] to confirm refills   ---------------------------------------------------------------------------  --------------  CALL BACK INFO  What is the best way for the office to contact you? OK to leave message on   voicemail  Preferred Call Back Phone Number? [phone]  ---------------------------------------------------------------------------  --------------  SCRIPT ANSWERS  Relationship to Patient?  Self

## 2022-04-27 NOTE — TELEPHONE ENCOUNTER
Spoke with [place] regarding med refills at [facility] , she states [facility] did call her this AM an notified her that scripts were received and put to mail order.

## 2022-04-28 ENCOUNTER — TELEPHONE (OUTPATIENT)
Dept: FAMILY MEDICINE CLINIC | Age: 72
End: 2022-04-28

## 2022-04-28 DIAGNOSIS — E03.9 ACQUIRED HYPOTHYROIDISM: Primary | ICD-10-CM

## 2022-04-28 NOTE — TELEPHONE ENCOUNTER
Spoke with [place] advised her to recheck Thyroid labs in 1 month to decide if dose change needed.

## 2022-04-28 NOTE — TELEPHONE ENCOUNTER
----- Message from Aurora Clark MD sent at 4/28/2022  8:23 AM EDT -----  Please let patient know that her thyroid levels suggest that her thyroid dose is too high. This has happened off and on during the years. It may be a passing thing or a possible need to change dose. I recommend that in a month she have that rechecked.  If there is a trend of high dose, then we'll adjust.

## 2022-04-28 NOTE — RESULT ENCOUNTER NOTE
Please let patient know that her thyroid levels suggest that her thyroid dose is too high. This has happened off and on during the years. It may be a passing thing or a possible need to change dose. I recommend that in a month she have that rechecked.  If there is a trend of high dose, then we'll adjust.

## 2022-05-24 ENCOUNTER — HOSPITAL ENCOUNTER (OUTPATIENT)
Age: 72
Discharge: HOME | End: 2022-05-24
Payer: COMMERCIAL

## 2022-05-24 DIAGNOSIS — E03.9 ACQUIRED HYPOTHYROIDISM: ICD-10-CM

## 2022-05-24 LAB
T4 FREE: 1.76 NG/DL (ref 0.93–1.76)
TSH SERPL DL<=0.05 MIU/L-ACNC: 0.09 UIU/ML (ref 0.4–4.2)

## 2022-05-24 PROCEDURE — 84443 ASSAY THYROID STIM HORMONE: CPT

## 2022-05-24 PROCEDURE — 36415 COLL VENOUS BLD VENIPUNCTURE: CPT

## 2022-05-24 PROCEDURE — 84439 ASSAY OF FREE THYROXINE: CPT

## 2022-05-31 NOTE — RESULT ENCOUNTER NOTE
Please let patient know that her thyroid testing continues to show hyperthyroid state or too much medication -- recommend she take 1/2 tablet one day of the week and then full tablet other days. We will address recheck at August visit. Thank you.

## 2022-06-01 ENCOUNTER — TELEPHONE (OUTPATIENT)
Dept: FAMILY MEDICINE CLINIC | Age: 72
End: 2022-06-01

## 2022-06-01 NOTE — TELEPHONE ENCOUNTER
----- Message from Eran Andrade MD sent at 5/31/2022  6:57 PM EDT -----  Please let patient know that her thyroid testing continues to show hyperthyroid state or too much medication -- recommend she take 1/2 tablet one day of the week and then full tablet other days. We will address recheck at August visit. Thank you.

## 2022-06-20 ENCOUNTER — TELEPHONE (OUTPATIENT)
Dept: FAMILY MEDICINE CLINIC | Age: 72
End: 2022-06-20

## 2022-06-20 DIAGNOSIS — Z12.31 SCREENING MAMMOGRAM, ENCOUNTER FOR: Primary | ICD-10-CM

## 2022-06-20 NOTE — TELEPHONE ENCOUNTER
----- Message from Sammy Stubbs sent at 6/20/2022 12:52 PM EDT -----  Subject: Message to Provider    QUESTIONS  Information for Provider? Patient needs an order for a mammogram so she   can have it done at the [facility]. Patient wants to know if she   needs an appointment for this. Please reach out to the patient to discuss. ---------------------------------------------------------------------------  --------------  Trisha COVINGTON  What is the best way for the office to contact you? OK to leave message on   voicemail  Preferred Call Back Phone Number? [phone]  ---------------------------------------------------------------------------  --------------  SCRIPT ANSWERS  Relationship to Patient?  Self

## 2022-06-20 NOTE — TELEPHONE ENCOUNTER
----- Message from Storm Guardado sent at 6/20/2022 12:52 PM EDT -----  Subject: Message to Provider    QUESTIONS  Information for Provider? Patient needs an order for a mammogram so she   can have it done at the [facility]. Patient wants to know if she   needs an appointment for this. Please reach out to the patient to discuss. ---------------------------------------------------------------------------  --------------  Radha COVINGTON  What is the best way for the office to contact you? OK to leave message on   voicemail  Preferred Call Back Phone Number? [phone]  ---------------------------------------------------------------------------  --------------  SCRIPT ANSWERS  Relationship to Patient?  Self

## 2022-06-20 NOTE — TELEPHONE ENCOUNTER
[facility] Mamm[facility] was ordered

## 2022-07-13 ENCOUNTER — HOSPITAL ENCOUNTER (OUTPATIENT)
Dept: MAMMOGRAPHY | Age: 72
Discharge: HOME | End: 2022-07-13
Payer: MEDICARE

## 2022-07-13 DIAGNOSIS — Z12.31 SCREENING MAMMOGRAM, ENCOUNTER FOR: ICD-10-CM

## 2022-07-13 PROCEDURE — 77067 SCR MAMMO BI INCL CAD: CPT

## 2022-08-16 ENCOUNTER — OFFICE VISIT (OUTPATIENT)
Dept: FAMILY MEDICINE CLINIC | Age: 72
End: 2022-08-16
Payer: MEDICARE

## 2022-08-16 VITALS
HEIGHT: 62 IN | OXYGEN SATURATION: 98 % | HEART RATE: 76 BPM | SYSTOLIC BLOOD PRESSURE: 114 MMHG | RESPIRATION RATE: 16 BRPM | WEIGHT: 148.2 LBS | DIASTOLIC BLOOD PRESSURE: 68 MMHG | BODY MASS INDEX: 27.27 KG/M2 | TEMPERATURE: 98.6 F

## 2022-08-16 DIAGNOSIS — E03.4 HYPOTHYROIDISM DUE TO ACQUIRED ATROPHY OF THYROID: Primary | ICD-10-CM

## 2022-08-16 DIAGNOSIS — K74.60 CIRRHOSIS OF LIVER WITHOUT ASCITES, UNSPECIFIED HEPATIC CIRRHOSIS TYPE (HCC): ICD-10-CM

## 2022-08-16 DIAGNOSIS — Z12.11 SCREEN FOR COLON CANCER: ICD-10-CM

## 2022-08-16 PROCEDURE — 99214 OFFICE O/P EST MOD 30 MIN: CPT | Performed by: FAMILY MEDICINE

## 2022-08-16 PROCEDURE — 1123F ACP DISCUSS/DSCN MKR DOCD: CPT | Performed by: FAMILY MEDICINE

## 2022-08-16 RX ORDER — LEVOTHYROXINE SODIUM 0.1 MG/1
TABLET ORAL
Qty: 90 TABLET | Refills: 1 | Status: SHIPPED | OUTPATIENT
Start: 2022-08-16 | End: 2022-09-20

## 2022-08-16 RX ORDER — FUROSEMIDE 40 MG/1
TABLET ORAL
Qty: 90 TABLET | Refills: 1 | Status: SHIPPED | OUTPATIENT
Start: 2022-08-16

## 2022-08-16 RX ORDER — POTASSIUM CHLORIDE 750 MG/1
30 TABLET, FILM COATED, EXTENDED RELEASE ORAL DAILY
Qty: 270 TABLET | Refills: 1 | Status: SHIPPED | OUTPATIENT
Start: 2022-08-16

## 2022-08-16 ASSESSMENT — PATIENT HEALTH QUESTIONNAIRE - PHQ9
2. FEELING DOWN, DEPRESSED OR HOPELESS: 0
SUM OF ALL RESPONSES TO PHQ QUESTIONS 1-9: 0
SUM OF ALL RESPONSES TO PHQ9 QUESTIONS 1 & 2: 0
1. LITTLE INTEREST OR PLEASURE IN DOING THINGS: 0
SUM OF ALL RESPONSES TO PHQ QUESTIONS 1-9: 0

## 2022-08-16 NOTE — PROGRESS NOTES
Alina Amaya (:  1950) is a 66 y.o. female,Established patient, here for evaluation of the following chief complaint(s):  6 Month Follow-Up (Declines pneum vacc)         ASSESSMENT/PLAN:  1. Hypothyroidism due to acquired atrophy of thyroid  -     levothyroxine (SYNTHROID) 100 MCG tablet; TAKE 1 TABLET EVERY DAY, Disp-90 tablet, R-1Normal  -     TSH With Reflex Ft4; Future  -     Basic Metabolic Panel; Future  2. Cirrhosis of liver without ascites, unspecified hepatic cirrhosis type (HCC)  -     furosemide (LASIX) 40 MG tablet; TAKE 1 TABLET EVERY DAY, Disp-90 tablet, R-1Normal  -     potassium chloride (KLOR-CON 10) 10 MEQ extended release tablet; Take 3 tablets by mouth in the morning., Disp-270 tablet, R-1Normal  -     TSH With Reflex Ft4; Future  -     Basic Metabolic Panel; Future      She is functioning well currently. Rather stable. Continue current medication but repeat labs due soon. May need to adjust dose further. Declines all vaccines. Return in about 6 months (around 2023) for AWV. Subjective   SUBJECTIVE/OBJECTIVE:  HPI    Patient following mainly for thyroid condition. Doing well. Good energy. Active with [facility] on her own, biking and other exercises, caring for home. No change in diet. Has good appetite. Lost some weight  Noted follow up TSH is low. Dose adjustment made. She is using one tab 6 days a week and then 1/2 another day of week. H/o cirrhosis due to hep C. But stable. Uses lasix for edema, also on potassium. She has not followed with GI.  US of liver 2022 w/o masses, nodular cirrhotic liver noted    Review of Systems   Constitutional:  Negative for fatigue and fever. Respiratory:  Negative for shortness of breath. Cardiovascular:  Negative for chest pain and leg swelling. Objective   Physical Exam     Gen: NAD, AAO x 3, coherent, pleasant      CTAb. RRR.  No leg edema currently  Lab Results   Component Value Date    TSH 0.090 (L) 05/24/2022     Lab Results   Component Value Date     02/16/2022    K 4.9 02/16/2022     02/16/2022    CO2 26 02/16/2022    BUN 9 02/16/2022    CREATININE 0.7 02/16/2022    GLUCOSE 91 02/16/2022    CALCIUM 9.4 02/16/2022    PROT 7.0 02/16/2022    LABALBU 4.2 02/16/2022    BILITOT 0.7 02/16/2022    ALKPHOS 101 02/16/2022    AST 44 (H) 02/16/2022    ALT 37 02/16/2022    LABGLOM 82 (A) 02/16/2022       Lab Results   Component Value Date    INR 0.96 02/16/2022    INR 1.16 (H) 01/10/2013    INR 1.11 01/09/2013               An electronic signature was used to authenticate this note.     --Renee Colvin MD

## 2022-08-17 ASSESSMENT — ENCOUNTER SYMPTOMS: SHORTNESS OF BREATH: 0

## 2022-08-23 LAB — NONINV COLON CA DNA+OCC BLD SCRN STL QL: NORMAL

## 2022-09-03 LAB — NONINV COLON CA DNA+OCC BLD SCRN STL QL: POSITIVE

## 2022-09-07 ENCOUNTER — TELEPHONE (OUTPATIENT)
Dept: FAMILY MEDICINE CLINIC | Age: 72
End: 2022-09-07

## 2022-09-07 DIAGNOSIS — R19.5 POSITIVE COLORECTAL CANCER SCREENING USING COLOGUARD TEST: Primary | ICD-10-CM

## 2022-09-07 NOTE — TELEPHONE ENCOUNTER
That's confusion and concerning that she would get a negative result. I recommend she talk with [facility] about this confusion. GI consult signed.

## 2022-09-07 NOTE — TELEPHONE ENCOUNTER
----- Message from Linda Martin MD sent at 9/6/2022  8:52 PM EDT -----  + colon cancer screen. Recommend colonoscopy to follow up on + cologuard. Does she have preference?

## 2022-09-07 NOTE — TELEPHONE ENCOUNTER
Called and notified patient of + don. Patient stated that she had received a letter stating that she was negative. Patient did agree to see GI. Referral pended.

## 2022-09-09 NOTE — TELEPHONE ENCOUNTER
Spoke with pt and she states that she will follow up with don. She has received a call from  and has an appt scheduled with them for October 2022. Pt inquired if she should fast for upcoming labs- informed her that she would need to fast for those labs. Pt also asked about her thyroid medication changing- let her know that if it needs changed- Dr. Marino Hence would base this decision off of her upcoming labs. Pt states that she will be out of her medication and the refill expires on 9/16/22. Let pt know to fill the thyroid prescription - so she is not out of her medication over the weekend as Dr. Marino Hence may not receive those results on 9/19/22. Pt verbalized understanding and had no further questions.

## 2022-09-16 ENCOUNTER — HOSPITAL ENCOUNTER (OUTPATIENT)
Age: 72
Discharge: HOME | End: 2022-09-16
Payer: MEDICARE

## 2022-09-16 DIAGNOSIS — E03.4 HYPOTHYROIDISM DUE TO ACQUIRED ATROPHY OF THYROID: ICD-10-CM

## 2022-09-16 DIAGNOSIS — K74.60 CIRRHOSIS OF LIVER WITHOUT ASCITES, UNSPECIFIED HEPATIC CIRRHOSIS TYPE (HCC): ICD-10-CM

## 2022-09-16 LAB
ANION GAP SERPL CALCULATED.3IONS-SCNC: 10 MEQ/L (ref 8–16)
BUN BLDV-MCNC: 14 MG/DL (ref 7–22)
CALCIUM SERPL-MCNC: 9.7 MG/DL (ref 8.5–10.5)
CHLORIDE BLD-SCNC: 102 MEQ/L (ref 98–111)
CO2: 29 MEQ/L (ref 23–33)
CREAT SERPL-MCNC: 0.9 MG/DL (ref 0.4–1.2)
GFR SERPL CREATININE-BSD FRML MDRD: 61 ML/MIN/1.73M2
GLUCOSE BLD-MCNC: 119 MG/DL (ref 70–108)
POTASSIUM SERPL-SCNC: 3.9 MEQ/L (ref 3.5–5.2)
SODIUM BLD-SCNC: 141 MEQ/L (ref 135–145)
T4 FREE: 1.87 NG/DL (ref 0.93–1.76)
TSH SERPL DL<=0.05 MIU/L-ACNC: 0.15 UIU/ML (ref 0.4–4.2)

## 2022-09-16 PROCEDURE — 36415 COLL VENOUS BLD VENIPUNCTURE: CPT

## 2022-09-16 PROCEDURE — 84443 ASSAY THYROID STIM HORMONE: CPT

## 2022-09-16 PROCEDURE — 80048 BASIC METABOLIC PNL TOTAL CA: CPT

## 2022-09-16 PROCEDURE — 84439 ASSAY OF FREE THYROXINE: CPT

## 2022-09-20 DIAGNOSIS — K74.60 CIRRHOSIS OF LIVER WITHOUT ASCITES, UNSPECIFIED HEPATIC CIRRHOSIS TYPE (HCC): Primary | ICD-10-CM

## 2022-09-20 DIAGNOSIS — E03.4 HYPOTHYROIDISM DUE TO ACQUIRED ATROPHY OF THYROID: ICD-10-CM

## 2022-09-20 RX ORDER — LEVOTHYROXINE SODIUM 0.1 MG/1
TABLET ORAL
Qty: 90 TABLET | Refills: 1
Start: 2022-09-20

## 2022-09-20 NOTE — RESULT ENCOUNTER NOTE
Please let patient know that her thyroid labs suggest still too high of a dose. She is likely absorbing her synthroid better and needing less. -- change to Synthroid 100 mcg daily for 5 days then 1/2 tab for 2 days of the week. -- will order labs for the February visit. Send to home if that helps her remember.   Other labs are within acceptable limits

## 2022-09-21 ENCOUNTER — TELEPHONE (OUTPATIENT)
Dept: FAMILY MEDICINE CLINIC | Age: 72
End: 2022-09-21

## 2022-09-21 NOTE — TELEPHONE ENCOUNTER
----- Message from Lillian Gonzalez MD sent at 9/20/2022  6:24 PM EDT -----  Please let patient know that her thyroid labs suggest still too high of a dose. She is likely absorbing her synthroid better and needing less. -- change to Synthroid 100 mcg daily for 5 days then 1/2 tab for 2 days of the week. -- will order labs for the February visit. Send to home if that helps her remember.   Other labs are within acceptable limits

## 2022-09-21 NOTE — TELEPHONE ENCOUNTER
Patient advised - verbalized understanding and had no further questions. States that she does not need labs sent to her at this time.

## 2022-10-13 ENCOUNTER — TELEPHONE (OUTPATIENT)
Dept: FAMILY MEDICINE CLINIC | Age: 72
End: 2022-10-13

## 2022-10-13 NOTE — TELEPHONE ENCOUNTER
Chart audit shows patient had a positive (abnormal) Cologuard test completed 8/28/2022. Audit shows results were entered correctly, ordering physician/APC reviewed and follow up plan in place.

## 2023-02-17 ENCOUNTER — OFFICE VISIT (OUTPATIENT)
Dept: FAMILY MEDICINE CLINIC | Age: 73
End: 2023-02-17
Payer: COMMERCIAL

## 2023-02-17 VITALS
SYSTOLIC BLOOD PRESSURE: 138 MMHG | TEMPERATURE: 97.3 F | HEART RATE: 85 BPM | HEIGHT: 62 IN | OXYGEN SATURATION: 99 % | BODY MASS INDEX: 28.16 KG/M2 | WEIGHT: 153 LBS | DIASTOLIC BLOOD PRESSURE: 82 MMHG

## 2023-02-17 DIAGNOSIS — R19.5 POSITIVE COLORECTAL CANCER SCREENING USING COLOGUARD TEST: ICD-10-CM

## 2023-02-17 DIAGNOSIS — K74.60 CIRRHOSIS OF LIVER WITHOUT ASCITES, UNSPECIFIED HEPATIC CIRRHOSIS TYPE (HCC): ICD-10-CM

## 2023-02-17 DIAGNOSIS — M51.36 DDD (DEGENERATIVE DISC DISEASE), LUMBAR: ICD-10-CM

## 2023-02-17 DIAGNOSIS — G99.0 NEUROPATHY IN LIVER DISEASE: ICD-10-CM

## 2023-02-17 DIAGNOSIS — Z00.00 MEDICARE ANNUAL WELLNESS VISIT, SUBSEQUENT: Primary | ICD-10-CM

## 2023-02-17 DIAGNOSIS — E03.4 HYPOTHYROIDISM DUE TO ACQUIRED ATROPHY OF THYROID: ICD-10-CM

## 2023-02-17 DIAGNOSIS — Z23 NEED FOR VACCINATION: ICD-10-CM

## 2023-02-17 DIAGNOSIS — K76.9 NEUROPATHY IN LIVER DISEASE: ICD-10-CM

## 2023-02-17 DIAGNOSIS — Z89.432 PARTIAL NONTRAUMATIC AMPUTATION OF LEFT FOOT (HCC): ICD-10-CM

## 2023-02-17 DIAGNOSIS — T84.011S FAILURE OF LEFT TOTAL HIP ARTHROPLASTY, SEQUELA: ICD-10-CM

## 2023-02-17 PROCEDURE — 90677 PCV20 VACCINE IM: CPT | Performed by: FAMILY MEDICINE

## 2023-02-17 PROCEDURE — G0009 ADMIN PNEUMOCOCCAL VACCINE: HCPCS | Performed by: FAMILY MEDICINE

## 2023-02-17 PROCEDURE — 1123F ACP DISCUSS/DSCN MKR DOCD: CPT | Performed by: FAMILY MEDICINE

## 2023-02-17 PROCEDURE — G0439 PPPS, SUBSEQ VISIT: HCPCS | Performed by: FAMILY MEDICINE

## 2023-02-17 RX ORDER — FUROSEMIDE 40 MG/1
TABLET ORAL
Qty: 90 TABLET | Refills: 1 | Status: SHIPPED | OUTPATIENT
Start: 2023-02-17

## 2023-02-17 RX ORDER — LEVOTHYROXINE SODIUM 0.1 MG/1
TABLET ORAL
Qty: 90 TABLET | Refills: 1 | Status: SHIPPED | OUTPATIENT
Start: 2023-02-17

## 2023-02-17 RX ORDER — POTASSIUM CHLORIDE 750 MG/1
30 TABLET, FILM COATED, EXTENDED RELEASE ORAL DAILY
Qty: 270 TABLET | Refills: 1 | Status: SHIPPED | OUTPATIENT
Start: 2023-02-17

## 2023-02-17 SDOH — ECONOMIC STABILITY: INCOME INSECURITY: HOW HARD IS IT FOR YOU TO PAY FOR THE VERY BASICS LIKE FOOD, HOUSING, MEDICAL CARE, AND HEATING?: NOT HARD AT ALL

## 2023-02-17 SDOH — ECONOMIC STABILITY: FOOD INSECURITY: WITHIN THE PAST 12 MONTHS, YOU WORRIED THAT YOUR FOOD WOULD RUN OUT BEFORE YOU GOT MONEY TO BUY MORE.: NEVER TRUE

## 2023-02-17 SDOH — ECONOMIC STABILITY: HOUSING INSECURITY
IN THE LAST 12 MONTHS, WAS THERE A TIME WHEN YOU DID NOT HAVE A STEADY PLACE TO SLEEP OR SLEPT IN A SHELTER (INCLUDING NOW)?: NO

## 2023-02-17 SDOH — ECONOMIC STABILITY: FOOD INSECURITY: WITHIN THE PAST 12 MONTHS, THE FOOD YOU BOUGHT JUST DIDN'T LAST AND YOU DIDN'T HAVE MONEY TO GET MORE.: NEVER TRUE

## 2023-02-17 ASSESSMENT — PATIENT HEALTH QUESTIONNAIRE - PHQ9
SUM OF ALL RESPONSES TO PHQ QUESTIONS 1-9: 0
SUM OF ALL RESPONSES TO PHQ QUESTIONS 1-9: 0
SUM OF ALL RESPONSES TO PHQ9 QUESTIONS 1 & 2: 0
1. LITTLE INTEREST OR PLEASURE IN DOING THINGS: 0
2. FEELING DOWN, DEPRESSED OR HOPELESS: 0
SUM OF ALL RESPONSES TO PHQ QUESTIONS 1-9: 0
SUM OF ALL RESPONSES TO PHQ QUESTIONS 1-9: 0

## 2023-02-17 ASSESSMENT — LIFESTYLE VARIABLES
HOW MANY STANDARD DRINKS CONTAINING ALCOHOL DO YOU HAVE ON A TYPICAL DAY: PATIENT DOES NOT DRINK
HOW OFTEN DO YOU HAVE A DRINK CONTAINING ALCOHOL: NEVER

## 2023-02-17 NOTE — PATIENT INSTRUCTIONS
Please obtain laboratories as soon as possible      Learning About Being Active as an Older Adult  Why is being active important as you get older? Being active is one of the best things you can do for your health. And it's never too late to start. Being active--or getting active, if you aren't already--has definite benefits. It can:  Give you more energy,  Keep your mind sharp. Improve balance to reduce your risk of falls. Help you manage chronic illness with fewer medicines. No matter how old you are, how fit you are, or what health problems you have, there is a form of activity that will work for you. And the more physical activity you can do, the better your overall health will be. What kinds of activity can help you stay healthy? Being more active will make your daily activities easier. Physical activity includes planned exercise and things you do in daily life. There are four types of activity:  Aerobic. Doing aerobic activity makes your heart and lungs strong. Includes walking, dancing, and gardening. Aim for at least 2½ hours spread throughout the week. It improves your energy and can help you sleep better. Muscle-strengthening. This type of activity can help maintain muscle and strengthen bones. Includes climbing stairs, using resistance bands, and lifting or carrying heavy loads. Aim for at least twice a week. It can help protect the knees and other joints. Stretching. Stretching gives you better range of motion in joints and muscles. Includes upper arm stretches, calf stretches, and gentle yoga. Aim for at least twice a week, preferably after your muscles are warmed up from other activities. It can help you function better in daily life. Balancing. This helps you stay coordinated and have good posture. Includes heel-to-toe walking, bailey chi, and certain types of yoga. Aim for at least 3 days a week. It can reduce your risk of falling.   Even if you have a hard time meeting the recommendations, it's better to be more active than less active. All activity done in each category counts toward your weekly total. You'd be surprised how daily things like carrying groceries, keeping up with grandchildren, and taking the stairs can add up. What keeps you from being active? If you've had a hard time being more active, you're not alone. Maybe you remember being able to do more. Or maybe you've never thought of yourself as being active. It's frustrating when you can't do the things you want. Being more active can help. What's holding you back? Getting started. Have a goal, but break it into easy tasks. Small steps build into big accomplishments. Staying motivated. If you feel like skipping your activity, remember your goal. Maybe you want to move better and stay independent. Every activity gets you one step closer. Not feeling your best.  Start with 5 minutes of an activity you enjoy. Prove to yourself you can do it. As you get comfortable, increase your time. You may not be where you want to be. But you're in the process of getting there. Everyone starts somewhere. How can you find safe ways to stay active? Talk with your doctor about any physical challenges you're facing. Make a plan with your doctor if you have a health problem or aren't sure how to get started with activity. If you're already active, ask your doctor if there is anything you should change to stay safe as your body and health change. If you tend to feel dizzy after you take medicine, avoid activity at that time. Try being active before you take your medicine. This will reduce your risk of falls. If you plan to be active at home, make sure to clear your space before you get started. Remove things like TV cords, coffee tables, and throw rugs. It's safest to have plenty of space to move freely. The key to getting more active is to take it slow and steady. Try to improve only a little bit at a time.  Pick just one area to improve on at first. And if an activity hurts, stop and talk to your doctor. Where can you learn more? Go to http://www.urena.com/ and enter P600 to learn more about \"Learning About Being Active as an Older Adult. \"  Current as of: October 10, 2022               Content Version: 13.5  © 2006-2022 [employer]. Care instructions adapted under license by [facility] ([facility]). If you have questions about a medical condition or this instruction, always ask your healthcare professional. [place] [address] any warranty or liability for your use of this information. Advance Directives: Care Instructions  Overview  An advance directive is a legal way to state your wishes at the end of your life. It tells your family and your doctor what to do if you can't say what you want. There are two main types of advance directives. You can change them any time your wishes change. Living will. This form tells your family and your doctor your wishes about life support and other treatment. The form is also called a declaration. Medical power of . This form lets you name a person to make treatment decisions for you when you can't speak for yourself. This person is called a health care agent (health care proxy, health care surrogate). The form is also called a durable power of  for health care. If you do not have an advance directive, decisions about your medical care may be made by a family member, or by a doctor or a  who doesn't know you. It may help to think of an advance directive as a gift to the people who care for you. If you have one, they won't have to make tough decisions by themselves. For more information, including forms for your state, see the [address] website (www.caringinfo.org/planning/advance-directives/). Follow-up care is a key part of your treatment and safety.  Be sure to make and go to all appointments, and call your doctor if you are having problems. It's also a good idea to know your test results and keep a list of the medicines you take. What should you include in an advance directive? Many states have a unique advance directive form. (It may ask you to address specific issues.) Or you might use a universal form that's approved by many states. If your form doesn't tell you what to address, it may be hard to know what to include in your advance directive. Use the questions below to help you get started. Who do you want to make decisions about your medical care if you are not able to? What life-support measures do you want if you have a serious illness that gets worse over time or can't be cured? What are you most afraid of that might happen? (Maybe you're afraid of having pain, losing your independence, or being kept alive by machines.)  Where would you prefer to die? (Your home? A hospital? A nursing home?)  Do you want to donate your organs when you die? Do you want certain Catholic practices performed before you die? When should you call for help? Be sure to contact your doctor if you have any questions. Where can you learn more? Go to http://www.urena.com/ and enter R264 to learn more about \"Advance Directives: Care Instructions. \"  Current as of: June 16, 2022               Content Version: 13.5  © 7219-1417 Healthwise, Incorporated. Care instructions adapted under license by [facility] ([facility]). If you have questions about a medical condition or this instruction, always ask your healthcare professional. [place] [address] any warranty or liability for your use of this information. A Healthy Heart: Care Instructions  Your Care Instructions     Coronary artery disease, also called heart disease, occurs when a substance called plaque builds up in the vessels that supply oxygen-rich blood to your heart muscle. This can narrow the blood vessels and reduce blood flow.  A heart attack happens when blood flow is completely blocked. A high-fat diet, smoking, and other factors increase the risk of heart disease. Your doctor has found that you have a chance of having heart disease. You can do lots of things to keep your heart healthy. It may not be easy, but you can change your diet, exercise more, and quit smoking. These steps really work to lower your chance of heart disease. Follow-up care is a key part of your treatment and safety. Be sure to make and go to all appointments, and call your doctor if you are having problems. It's also a good idea to know your test results and keep a list of the medicines you take. How can you care for yourself at home? Diet    Use less salt when you cook and eat. This helps lower your blood pressure. Taste food before salting. Add only a little salt when you think you need it. With time, your taste buds will adjust to less salt. Eat fewer snack items, fast foods, canned soups, and other high-salt, high-fat, processed foods. Read food labels and try to avoid saturated and trans fats. They increase your risk of heart disease by raising cholesterol levels. Limit the amount of solid fat-butter, margarine, and shortening-you eat. Use olive, peanut, or canola oil when you cook. Bake, broil, and steam foods instead of frying them. Eat a variety of fruit and vegetables every day. Dark green, deep orange, red, or yellow fruits and vegetables are especially good for you. Examples include spinach, carrots, peaches, and berries. Foods high in fiber can reduce your cholesterol and provide important vitamins and minerals. High-fiber foods include whole-grain cereals and breads, oatmeal, beans, brown rice, citrus fruits, and apples. Eat lean proteins. Heart-healthy proteins include seafood, lean meats and poultry, eggs, beans, peas, nuts, seeds, and soy products. Limit drinks and foods with added sugar. These include candy, desserts, and soda pop.    Lifestyle changes    If your doctor recommends it, get more exercise. Walking is a good choice. Bit by bit, increase the amount you walk every day. Try for at least 30 minutes on most days of the week. You also may want to swim, bike, or do other activities. Do not smoke. If you need help quitting, talk to your doctor about stop-smoking programs and medicines. These can increase your chances of quitting for good. Quitting smoking may be the most important step you can take to protect your heart. It is never too late to quit. Limit alcohol to 2 drinks a day for men and 1 drink a day for women. Too much alcohol can cause health problems. Manage other health problems such as diabetes, high blood pressure, and high cholesterol. If you think you may have a problem with alcohol or drug use, talk to your doctor. Medicines    Take your medicines exactly as prescribed. Call your doctor if you think you are having a problem with your medicine. If your doctor recommends aspirin, take the amount directed each day. Make sure you take aspirin and not another kind of pain reliever, such as acetaminophen (Tylenol). When should you call for help? Call 911 if you have symptoms of a heart attack. These may include:    Chest pain or pressure, or a strange feeling in the chest.     Sweating. Shortness of breath. Pain, pressure, or a strange feeling in the back, neck, jaw, or upper belly or in one or both shoulders or arms. Lightheadedness or sudden weakness. A fast or irregular heartbeat. After you call 911, the  may tell you to chew 1 adult-strength or 2 to 4 low-dose aspirin. Wait for an ambulance. Do not try to drive yourself. Watch closely for changes in your health, and be sure to contact your doctor if you have any problems. Where can you learn more? Go to http://www.urena.com/ and enter F075 to learn more about \"A Healthy Heart: Care Instructions. \"  Current as of: September 7, 2022               Content Version: 13.5  © 5119-1694 Healthwise, [employer]. Care instructions adapted under license by [facility]. If you have questions about a medical condition or this instruction, always ask your healthcare professional. [address] any warranty or liability for your use of this information. Personalized Preventive Plan for [place] - 2/17/2023  Medicare offers a range of preventive health benefits. Some of the tests and screenings are paid in full while other may be subject to a deductible, co-insurance, and/or copay. Some of these benefits include a comprehensive review of your medical history including lifestyle, illnesses that may run in your family, and various assessments and screenings as appropriate. After reviewing your medical record and screening and assessments performed today your provider may have ordered immunizations, labs, imaging, and/or referrals for you. A list of these orders (if applicable) as well as your Preventive Care list are included within your After Visit Summary for your review. Other Preventive Recommendations:    A preventive eye exam performed by an eye specialist is recommended every 1-2 years to screen for glaucoma; cataracts, macular degeneration, and other eye disorders. A preventive dental visit is recommended every 6 months. Try to get at least 150 minutes of exercise per week or 10,000 steps per day on a pedometer . Order or download the FREE \"Exercise & Physical Activity: Your Everyday Guide\" from [employer]. Call 5-611.882.8926 or search [employer] online. You need 7516-8037 mg of calcium and 4871-8185 IU of vitamin D per day.  It is possible to meet your calcium requirement with diet alone, but a vitamin D supplement is usually necessary to meet this goal.  When exposed to the sun, use a sunscreen that protects against both UVA and UVB radiation with an SPF of 30 or greater. Reapply every 2 to 3 hours or after sweating, drying off with a towel, or swimming. Always wear a seat belt when traveling in a car. Always wear a helmet when riding a bicycle or motorcycle.

## 2023-02-17 NOTE — PROGRESS NOTES
Marisol Jelena Cr  1950    Are you sick today? no  Do you have allergies to medications, food, a vaccine component, or latex? no  Have you ever had a serious reaction after receiving a vaccination? no  Do you have a long-term health problem with heart, lung, kidney, or metabolic disease (e.g. diabetes), asthma, a blood disorder, no spleen, complement component deficiency, a cochlear implant, or a spinal fluid leak? Are you on long-term aspirin therapy? no  Do you have cancer, leukemia, HIV/AIDS, or any other immune system problem? no  Do you have a parent, brother, or sister with an immune system problem? no  In the past 3 months, have you taken medications that affect your immune system, such as prednisone, other steroids, or anticancer drugs; drugs for the treatment of rheumatoid arthritis, Crohn's disease, or psoriasis; or have you had radiation treatment? no  Have you had a seizure or a brain or other nervous system problem?  no  During the past year, have you received a transfusion of blood or blood products, or been given immune (gamma) globulin or an antiviral drug? no  For women: Are you pregnant or is there a chance you could become pregnant during the next month? no  Have you received any vaccinations in the past 4 weeks? no    Form Completed by: [place] on 2/17/2023 at 2:47 PM EST  Form Reviewed by: Keshia Juarez on 2/17/2023 at 2:47 PM EST

## 2023-02-21 ENCOUNTER — HOSPITAL ENCOUNTER (OUTPATIENT)
Age: 73
Discharge: HOME | End: 2023-02-21
Payer: COMMERCIAL

## 2023-02-21 DIAGNOSIS — K74.60 CIRRHOSIS OF LIVER WITHOUT ASCITES, UNSPECIFIED HEPATIC CIRRHOSIS TYPE (HCC): ICD-10-CM

## 2023-02-21 DIAGNOSIS — E03.4 HYPOTHYROIDISM DUE TO ACQUIRED ATROPHY OF THYROID: ICD-10-CM

## 2023-02-21 LAB
ALBUMIN SERPL BCG-MCNC: 4.1 G/DL (ref 3.5–5.1)
ALP SERPL-CCNC: 110 U/L (ref 38–126)
ALT SERPL W/O P-5'-P-CCNC: 32 U/L (ref 11–66)
ANION GAP SERPL CALC-SCNC: 12 MEQ/L (ref 8–16)
AST SERPL-CCNC: 41 U/L (ref 5–40)
BILIRUB SERPL-MCNC: 0.7 MG/DL (ref 0.3–1.2)
BUN SERPL-MCNC: 13 MG/DL (ref 7–22)
CALCIUM SERPL-MCNC: 9.5 MG/DL (ref 8.5–10.5)
CHLORIDE SERPL-SCNC: 101 MEQ/L (ref 98–111)
CHOLESTEROL, FASTING: 168 MG/DL (ref 100–199)
CO2 SERPL-SCNC: 27 MEQ/L (ref 23–33)
CREAT SERPL-MCNC: 0.8 MG/DL (ref 0.4–1.2)
GFR SERPL CREATININE-BSD FRML MDRD: > 60 ML/MIN/1.73M2
GLUCOSE SERPL-MCNC: 92 MG/DL (ref 70–108)
HDLC SERPL-MCNC: 47 MG/DL
LDLC SERPL CALC-MCNC: 101 MG/DL
POTASSIUM SERPL-SCNC: 4.3 MEQ/L (ref 3.5–5.2)
PROT SERPL-MCNC: 7.2 G/DL (ref 6.1–8)
SODIUM SERPL-SCNC: 140 MEQ/L (ref 135–145)
TRIGLYCERIDE, FASTING: 100 MG/DL (ref 0–199)
TSH SERPL DL<=0.005 MIU/L-ACNC: 0.92 UIU/ML (ref 0.4–4.2)

## 2023-02-21 PROCEDURE — 36415 COLL VENOUS BLD VENIPUNCTURE: CPT

## 2023-02-21 PROCEDURE — 80053 COMPREHEN METABOLIC PANEL: CPT

## 2023-02-21 PROCEDURE — 84443 ASSAY THYROID STIM HORMONE: CPT

## 2023-02-21 PROCEDURE — 80061 LIPID PANEL: CPT

## 2023-02-27 ENCOUNTER — TELEPHONE (OUTPATIENT)
Dept: FAMILY MEDICINE CLINIC | Age: 73
End: 2023-02-27

## 2023-02-27 NOTE — TELEPHONE ENCOUNTER
----- Message from Danna Lamas MD sent at 2/27/2023  8:36 AM EST -----  Please let patient know that labs are stable with liver labs mildly elevated. Thank you.

## 2023-05-05 DIAGNOSIS — K74.60 CIRRHOSIS OF LIVER WITHOUT ASCITES, UNSPECIFIED HEPATIC CIRRHOSIS TYPE (HCC): ICD-10-CM

## 2023-05-05 RX ORDER — POTASSIUM CHLORIDE 750 MG/1
30 TABLET, FILM COATED, EXTENDED RELEASE ORAL DAILY
Qty: 270 TABLET | Refills: 3 | Status: SHIPPED | OUTPATIENT
Start: 2023-05-05

## 2023-05-05 NOTE — TELEPHONE ENCOUNTER
Patient needs a refill of her POTASSIUM CHLORIDE 10 MEQ sent to [address] [facility] Mail in service

## 2023-05-05 NOTE — TELEPHONE ENCOUNTER
[address] called requesting a refill on the following medications:  Requested Prescriptions     Pending Prescriptions Disp Refills    potassium chloride (KLOR-CON 10) 10 MEQ extended release tablet 270 tablet 0     Sig: Take 3 tablets by mouth daily       Date of last visit: 2/17/2023  Date of next visit (if applicable):8/28/2023  Date of last refill: 2/17/2023  Pharmacy Name: [address] [place] [address] Mail Service       Thanks,  Castillo Yeboah LPN

## 2023-07-14 ENCOUNTER — HOSPITAL ENCOUNTER (OUTPATIENT)
Dept: MAMMOGRAPHY | Age: 73
Discharge: HOME | End: 2023-07-14
Payer: COMMERCIAL

## 2023-07-14 DIAGNOSIS — Z12.31 VISIT FOR SCREENING MAMMOGRAM: ICD-10-CM

## 2023-07-14 PROCEDURE — 77063 BREAST TOMOSYNTHESIS BI: CPT

## 2023-08-28 ENCOUNTER — OFFICE VISIT (OUTPATIENT)
Dept: FAMILY MEDICINE CLINIC | Age: 73
End: 2023-08-28
Payer: COMMERCIAL

## 2023-08-28 VITALS
HEART RATE: 77 BPM | RESPIRATION RATE: 16 BRPM | HEIGHT: 62 IN | OXYGEN SATURATION: 98 % | SYSTOLIC BLOOD PRESSURE: 120 MMHG | TEMPERATURE: 97.7 F | WEIGHT: 146.8 LBS | DIASTOLIC BLOOD PRESSURE: 70 MMHG | BODY MASS INDEX: 27.02 KG/M2

## 2023-08-28 DIAGNOSIS — M51.36 DDD (DEGENERATIVE DISC DISEASE), LUMBAR: Chronic | ICD-10-CM

## 2023-08-28 DIAGNOSIS — K74.60 CIRRHOSIS OF LIVER WITHOUT ASCITES, UNSPECIFIED HEPATIC CIRRHOSIS TYPE (HCC): Primary | ICD-10-CM

## 2023-08-28 DIAGNOSIS — E03.4 HYPOTHYROIDISM DUE TO ACQUIRED ATROPHY OF THYROID: ICD-10-CM

## 2023-08-28 PROCEDURE — 99213 OFFICE O/P EST LOW 20 MIN: CPT | Performed by: FAMILY MEDICINE

## 2023-08-28 PROCEDURE — 1123F ACP DISCUSS/DSCN MKR DOCD: CPT | Performed by: FAMILY MEDICINE

## 2023-08-28 RX ORDER — POTASSIUM CHLORIDE 750 MG/1
30 TABLET, FILM COATED, EXTENDED RELEASE ORAL DAILY
Qty: 270 TABLET | Refills: 3 | Status: SHIPPED | OUTPATIENT
Start: 2023-08-28

## 2023-08-28 RX ORDER — LEVOTHYROXINE SODIUM 0.1 MG/1
TABLET ORAL
Qty: 90 TABLET | Refills: 3 | Status: SHIPPED | OUTPATIENT
Start: 2023-08-28

## 2023-08-28 RX ORDER — FUROSEMIDE 40 MG/1
TABLET ORAL
Qty: 90 TABLET | Refills: 3 | Status: SHIPPED | OUTPATIENT
Start: 2023-08-28

## 2023-08-28 NOTE — PATIENT INSTRUCTIONS
Obtain labs a week or so prior to next [address] your records indicate that you are due for a Tetanus vaccine. We would like you to contact your insurance company to:   1. Check Coverage   2. Find out where you should receive this; our office or your local pharmacy. Thank you! [address] your records show you are due for the Shingrix vaccination. This is a vaccine to protect you from getting shingles. It is 90% plus protective. This is a great improvement over the previous vaccine. Many insurance plans are covering this vaccine. We do recommend you consider being vaccinated with this product. Shingrix is available through our office or your local pharmacy depending on your insurance coverage. We would like you to contact your insurance company to:   1. Check Coverage   2. Find out where you should receive this; our office or your local pharmacy. Thank you!

## 2023-08-28 NOTE — PROGRESS NOTES
(69.4 kg)   08/16/22 148 lb 3.2 oz (67.2 kg)         Review of Systems   Constitutional:  Negative for fatigue and fever. Respiratory:  Negative for shortness of breath. Cardiovascular:  Negative for chest pain and leg swelling. Objective   Physical Exam  Constitutional:       General: She is not in acute distress. Appearance: Normal appearance. She is not ill-appearing. Cardiovascular:      Rate and Rhythm: Normal rate and regular rhythm. Heart sounds: No murmur heard. Pulmonary:      Effort: Pulmonary effort is normal. No respiratory distress. Breath sounds: Normal breath sounds. No wheezing. Musculoskeletal:         General: No swelling. Neurological:      Mental Status: She is alert.    Psychiatric:         Mood and Affect: Mood normal.          Lab Results   Component Value Date    LABA1C 5.3 02/16/2022     No results found for: EAG  Lab Results   Component Value Date    LABA1C 5.3 02/16/2022       Lab Results   Component Value Date     02/21/2023    K 4.3 02/21/2023     02/21/2023    CO2 27 02/21/2023    BUN 13 02/21/2023    CREATININE 0.8 02/21/2023    CALCIUM 9.5 02/21/2023    GLUCOSE 92 02/21/2023        Lab Results   Component Value Date    CHOL 152 02/16/2022    CHOL 164 03/20/2021    CHOL 160 10/03/2018     Lab Results   Component Value Date    TRIG 106 02/16/2022    TRIG 57 03/20/2021    TRIG 96 10/03/2018     Lab Results   Component Value Date    HDL 47 02/21/2023    HDL 47 02/16/2022    HDL 65 03/20/2021     Lab Results   Component Value Date    LDLCALC 101 02/21/2023    LDLCALC 84 02/16/2022    LDLCALC 88 03/20/2021     No results found for: LABVLDL, VLDL  No results found for: CHOLHDLRATIO    No results found for: [facility]    Lab Results   Component Value Date    TSH 0.918 02/21/2023      No results found for: [id number]   Lab Results   Component Value Date    MG 1.6 11/13/2012      Lab Results   Component Value Date    ALT 32 02/21/2023    AST 41 (H)

## 2023-08-29 ASSESSMENT — ENCOUNTER SYMPTOMS: SHORTNESS OF BREATH: 0

## 2024-02-07 ENCOUNTER — TELEPHONE (OUTPATIENT)
Dept: FAMILY MEDICINE CLINIC | Age: 74
End: 2024-02-07

## 2024-02-07 NOTE — TELEPHONE ENCOUNTER
Patient informed that PCP sent a year supply of medications  8/28/23. Patient will contact [facility].

## 2024-02-07 NOTE — TELEPHONE ENCOUNTER
[place] needs all her medications refilled through [facility] before her appt on the 21st.  Furosemide 40mg 1QD  Levothyroxine 100mcg 1 PO daily for 5 days then 1/2 tab 2 days of the week  Potassium choride 30mg 3 tabs by mouth daily

## 2024-02-15 DIAGNOSIS — K74.60 CIRRHOSIS OF LIVER WITHOUT ASCITES, UNSPECIFIED HEPATIC CIRRHOSIS TYPE (HCC): ICD-10-CM

## 2024-02-15 DIAGNOSIS — E03.4 HYPOTHYROIDISM DUE TO ACQUIRED ATROPHY OF THYROID: ICD-10-CM

## 2024-02-15 RX ORDER — FUROSEMIDE 40 MG/1
TABLET ORAL
Qty: 90 TABLET | Refills: 3 | Status: SHIPPED | OUTPATIENT
Start: 2024-02-15 | End: 2024-02-21 | Stop reason: SDUPTHER

## 2024-02-15 RX ORDER — LEVOTHYROXINE SODIUM 0.1 MG/1
TABLET ORAL
Qty: 90 TABLET | Refills: 3 | Status: SHIPPED | OUTPATIENT
Start: 2024-02-15 | End: 2024-02-21 | Stop reason: SDUPTHER

## 2024-02-15 RX ORDER — POTASSIUM CHLORIDE 750 MG/1
30 TABLET, FILM COATED, EXTENDED RELEASE ORAL DAILY
Qty: 270 TABLET | Refills: 3 | Status: SHIPPED | OUTPATIENT
Start: 2024-02-15 | End: 2024-02-21 | Stop reason: SDUPTHER

## 2024-02-15 NOTE — TELEPHONE ENCOUNTER
Keshia Ramon called requesting a refill on the following medications:  Requested Prescriptions     Pending Prescriptions Disp Refills    furosemide (LASIX) 40 MG tablet 90 tablet 3     Sig: TAKE 1 TABLET EVERY DAY    levothyroxine (SYNTHROID) 100 MCG tablet 90 tablet 3     Sig: TAKE 1 po daily for 5 days then 1/2 tab po two days of the week    potassium chloride (KLOR-CON 10) 10 MEQ extended release tablet 270 tablet 3     Sig: Take 3 tablets by mouth daily       Date of last visit: 8/28/2023  Date of next visit (if applicable):2/21/2024  Date of last refill: 8/28/2023  Pharmacy Name: [facility]   Patient would like new Rx sent to [facility] due to insurance.      Thanks,  Caroline Hernandez MA

## 2024-02-21 ENCOUNTER — OFFICE VISIT (OUTPATIENT)
Dept: FAMILY MEDICINE CLINIC | Age: 74
End: 2024-02-21
Payer: COMMERCIAL

## 2024-02-21 VITALS
TEMPERATURE: 97.3 F | RESPIRATION RATE: 16 BRPM | HEIGHT: 62 IN | DIASTOLIC BLOOD PRESSURE: 72 MMHG | SYSTOLIC BLOOD PRESSURE: 136 MMHG | BODY MASS INDEX: 26.43 KG/M2 | HEART RATE: 86 BPM | WEIGHT: 143.6 LBS | OXYGEN SATURATION: 99 %

## 2024-02-21 DIAGNOSIS — K76.9 NEUROPATHY IN LIVER DISEASE: Chronic | ICD-10-CM

## 2024-02-21 DIAGNOSIS — R19.5 POSITIVE COLORECTAL CANCER SCREENING USING COLOGUARD TEST: ICD-10-CM

## 2024-02-21 DIAGNOSIS — Z00.00 MEDICARE ANNUAL WELLNESS VISIT, SUBSEQUENT: Primary | ICD-10-CM

## 2024-02-21 DIAGNOSIS — G99.0 NEUROPATHY IN LIVER DISEASE: Chronic | ICD-10-CM

## 2024-02-21 DIAGNOSIS — E03.4 HYPOTHYROIDISM DUE TO ACQUIRED ATROPHY OF THYROID: ICD-10-CM

## 2024-02-21 DIAGNOSIS — B18.2 CHRONIC HEPATITIS C WITHOUT HEPATIC COMA (HCC): Chronic | ICD-10-CM

## 2024-02-21 DIAGNOSIS — K74.60 CIRRHOSIS OF LIVER WITHOUT ASCITES, UNSPECIFIED HEPATIC CIRRHOSIS TYPE (HCC): ICD-10-CM

## 2024-02-21 PROCEDURE — G0439 PPPS, SUBSEQ VISIT: HCPCS | Performed by: FAMILY MEDICINE

## 2024-02-21 PROCEDURE — 1123F ACP DISCUSS/DSCN MKR DOCD: CPT | Performed by: FAMILY MEDICINE

## 2024-02-21 RX ORDER — POTASSIUM CHLORIDE 750 MG/1
30 TABLET, FILM COATED, EXTENDED RELEASE ORAL DAILY
Qty: 270 TABLET | Refills: 3 | Status: SHIPPED | OUTPATIENT
Start: 2024-02-21

## 2024-02-21 RX ORDER — FUROSEMIDE 40 MG/1
TABLET ORAL
Qty: 90 TABLET | Refills: 3 | Status: SHIPPED | OUTPATIENT
Start: 2024-02-21

## 2024-02-21 RX ORDER — LEVOTHYROXINE SODIUM 0.1 MG/1
TABLET ORAL
Qty: 90 TABLET | Refills: 3 | Status: SHIPPED | OUTPATIENT
Start: 2024-02-21

## 2024-02-21 SDOH — ECONOMIC STABILITY: INCOME INSECURITY: HOW HARD IS IT FOR YOU TO PAY FOR THE VERY BASICS LIKE FOOD, HOUSING, MEDICAL CARE, AND HEATING?: NOT HARD AT ALL

## 2024-02-21 SDOH — ECONOMIC STABILITY: FOOD INSECURITY: WITHIN THE PAST 12 MONTHS, YOU WORRIED THAT YOUR FOOD WOULD RUN OUT BEFORE YOU GOT MONEY TO BUY MORE.: NEVER TRUE

## 2024-02-21 SDOH — ECONOMIC STABILITY: FOOD INSECURITY: WITHIN THE PAST 12 MONTHS, THE FOOD YOU BOUGHT JUST DIDN'T LAST AND YOU DIDN'T HAVE MONEY TO GET MORE.: NEVER TRUE

## 2024-02-21 ASSESSMENT — PATIENT HEALTH QUESTIONNAIRE - PHQ9
SUM OF ALL RESPONSES TO PHQ QUESTIONS 1-9: 0
1. LITTLE INTEREST OR PLEASURE IN DOING THINGS: 0
SUM OF ALL RESPONSES TO PHQ9 QUESTIONS 1 & 2: 0
SUM OF ALL RESPONSES TO PHQ QUESTIONS 1-9: 0
2. FEELING DOWN, DEPRESSED OR HOPELESS: 0

## 2024-02-21 ASSESSMENT — LIFESTYLE VARIABLES
HOW OFTEN DO YOU HAVE A DRINK CONTAINING ALCOHOL: NEVER
HOW MANY STANDARD DRINKS CONTAINING ALCOHOL DO YOU HAVE ON A TYPICAL DAY: PATIENT DOES NOT DRINK

## 2024-02-21 NOTE — PROGRESS NOTES
appointments were made and/or referrals ordered.    No Positive Risk Factors identified today.                                  Objective   Vitals:    02/21/24 0856   BP: 136/72   Pulse: 86   Resp: 16   Temp: 97.3 °F (36.3 °C)   SpO2: 99%   Weight: 65.1 kg (143 lb 9.6 oz)   Height: 1.575 m (5' 2\")      Body mass index is 26.26 kg/m².      Gen: NAD, AAO x 3, coherent, pleasant  CTAb. RRR. No edema  Abd soft, no HSM. No ascites.         No Known Allergies  Prior to Visit Medications    Medication Sig Taking? Authorizing Provider   furosemide (LASIX) 40 MG tablet TAKE 1 TABLET EVERY DAY Yes Sylvia Woody MD   levothyroxine (SYNTHROID) 100 MCG tablet TAKE 1 po daily for 5 days then 1/2 tab po two days of the week Yes Sylvia Woody MD   potassium chloride (KLOR-CON 10) 10 MEQ extended release tablet Take 3 tablets by mouth daily Yes Sylvia Woody MD   Handicap Placard MISC by Does not apply route Expiration in 5 years Yes Sylvia Woody MD       [facility] (Including outside providers/suppliers regularly involved in providing care):   Patient Care Team:  Sylvia Woody MD as PCP - General (Family Medicine)  Sylvia Woody MD as PCP - Empaneled Provider     Reviewed and updated this visit:  Tobacco  Allergies  Meds  Problems  Med Hx  Surg Hx  Soc Hx  Fam Hx

## 2024-02-22 ENCOUNTER — HOSPITAL ENCOUNTER (OUTPATIENT)
Age: 74
Discharge: HOME | End: 2024-02-22
Payer: COMMERCIAL

## 2024-02-22 DIAGNOSIS — M51.36 DDD (DEGENERATIVE DISC DISEASE), LUMBAR: Chronic | ICD-10-CM

## 2024-02-22 DIAGNOSIS — E03.4 HYPOTHYROIDISM DUE TO ACQUIRED ATROPHY OF THYROID: ICD-10-CM

## 2024-02-22 DIAGNOSIS — K74.60 CIRRHOSIS OF LIVER WITHOUT ASCITES, UNSPECIFIED HEPATIC CIRRHOSIS TYPE (HCC): ICD-10-CM

## 2024-02-22 LAB
ALBUMIN SERPL BCG-MCNC: 4.1 G/DL (ref 3.5–5.1)
ALP SERPL-CCNC: 108 U/L (ref 38–126)
ALT SERPL W/O P-5'-P-CCNC: 34 U/L (ref 11–66)
ANION GAP SERPL CALC-SCNC: 10 MEQ/L (ref 8–16)
AST SERPL-CCNC: 43 U/L (ref 5–40)
BASOPHILS ABSOLUTE: 0 THOU/MM3 (ref 0–0.1)
BASOPHILS NFR BLD AUTO: 0.2 %
BILIRUB SERPL-MCNC: 0.7 MG/DL (ref 0.3–1.2)
BUN SERPL-MCNC: 9 MG/DL (ref 7–22)
CALCIUM SERPL-MCNC: 9.8 MG/DL (ref 8.5–10.5)
CHLORIDE SERPL-SCNC: 101 MEQ/L (ref 98–111)
CHOLEST SERPL-MCNC: 160 MG/DL (ref 100–199)
CO2 SERPL-SCNC: 28 MEQ/L (ref 23–33)
CREAT SERPL-MCNC: 0.9 MG/DL (ref 0.4–1.2)
DEPRECATED RDW RBC AUTO: 45.7 FL (ref 35–45)
EOSINOPHIL NFR BLD AUTO: 2.4 %
EOSINOPHILS ABSOLUTE: 0.1 THOU/MM3 (ref 0–0.4)
ERYTHROCYTE [DISTWIDTH] IN BLOOD BY AUTOMATED COUNT: 12.9 % (ref 11.5–14.5)
GFR SERPL CREATININE-BSD FRML MDRD: > 60 ML/MIN/1.73M2
GLUCOSE SERPL-MCNC: 93 MG/DL (ref 70–108)
HCT VFR BLD AUTO: 44.1 % (ref 37–47)
HDLC SERPL-MCNC: 45 MG/DL
HGB BLD-MCNC: 13.9 GM/DL (ref 12–16)
IMM GRANULOCYTES # BLD AUTO: 0 THOU/MM3 (ref 0–0.07)
IMM GRANULOCYTES NFR BLD AUTO: 0 %
LDLC SERPL CALC-MCNC: 93 MG/DL
LYMPHOCYTES ABSOLUTE: 2 THOU/MM3 (ref 1–4.8)
LYMPHOCYTES NFR BLD AUTO: 47.4 %
MAGNESIUM SERPL-MCNC: 2.2 MG/DL (ref 1.6–2.4)
MCH RBC QN AUTO: 30.4 PG (ref 26–33)
MCHC RBC AUTO-ENTMCNC: 31.5 GM/DL (ref 32.2–35.5)
MCV RBC AUTO: 96.5 FL (ref 81–99)
MONOCYTES ABSOLUTE: 0.4 THOU/MM3 (ref 0.4–1.3)
MONOCYTES NFR BLD AUTO: 9.7 %
NEUTROPHILS NFR BLD AUTO: 40.3 %
NRBC BLD AUTO-RTO: 0 /100 WBC
PLATELET # BLD AUTO: 121 THOU/MM3 (ref 130–400)
PMV BLD AUTO: 11.5 FL (ref 9.4–12.4)
POTASSIUM SERPL-SCNC: 4 MEQ/L (ref 3.5–5.2)
PROT SERPL-MCNC: 8 G/DL (ref 6.1–8)
RBC # BLD AUTO: 4.57 MILL/MM3 (ref 4.2–5.4)
SEGMENTED NEUTROPHILS ABSOLUTE COUNT: 1.7 THOU/MM3 (ref 1.8–7.7)
SODIUM SERPL-SCNC: 139 MEQ/L (ref 135–145)
TRIGL SERPL-MCNC: 110 MG/DL (ref 0–199)
TSH SERPL DL<=0.005 MIU/L-ACNC: 3.25 UIU/ML (ref 0.4–4.2)
WBC # BLD AUTO: 4.2 THOU/MM3 (ref 4.8–10.8)

## 2024-02-22 PROCEDURE — 85025 COMPLETE CBC W/AUTO DIFF WBC: CPT

## 2024-02-22 PROCEDURE — 83735 ASSAY OF MAGNESIUM: CPT

## 2024-02-22 PROCEDURE — 84443 ASSAY THYROID STIM HORMONE: CPT

## 2024-02-22 PROCEDURE — 80061 LIPID PANEL: CPT

## 2024-02-22 PROCEDURE — 80053 COMPREHEN METABOLIC PANEL: CPT

## 2024-02-22 PROCEDURE — 36415 COLL VENOUS BLD VENIPUNCTURE: CPT

## 2024-02-26 ENCOUNTER — TELEPHONE (OUTPATIENT)
Dept: FAMILY MEDICINE CLINIC | Age: 74
End: 2024-02-26

## 2024-02-26 NOTE — TELEPHONE ENCOUNTER
----- Message from Sylvia Woody MD sent at 2/23/2024  4:10 PM EST -----  Please let patient know that her labs are stable.  Thank you.

## 2024-04-17 NOTE — PROGRESS NOTES
Medicare Annual Wellness Visit    [place] is here for [facility] AWV    Assessment & Plan   Medicare annual wellness visit, subsequent  -     Handicap Placard MISC; Starting Fri 2/17/2023, Disp-1 each, R-0, PrintExpiration in 5 years  Hypothyroidism due to acquired atrophy of thyroid  -     levothyroxine (SYNTHROID) 100 MCG tablet; TAKE 1 po daily for 5 days then 1/2 tab po two days of the week, Disp-90 tablet, R-1Normal  -     Handicap Placard MISC; Starting Fri 2/17/2023, Disp-1 each, R-0, PrintExpiration in 5 years  Cirrhosis of liver without ascites, unspecified hepatic cirrhosis type ([address]  -     furosemide (LASIX) 40 MG tablet; TAKE 1 TABLET EVERY DAY, Disp-90 tablet, R-1Normal  -     potassium chloride (KLOR-CON 10) 10 MEQ extended release tablet; Take 3 tablets by mouth daily, Disp-270 tablet, R-1Normal  -     Handicap Placard MISC; Starting Fri 2/17/2023, Disp-1 each, R-0, PrintExpiration in 5 years  Failure of left total hip arthroplasty, sequela  -     Handicap Placard MISC; Starting Fri 2/17/2023, Disp-1 each, R-0, PrintExpiration in 5 years  Partial nontraumatic amputation of left foot ([address]  -     Handicap Placard MISC; Starting Fri 2/17/2023, Disp-1 each, R-0, PrintExpiration in 5 years  DDD (degenerative disc disease), lumbar  -     Handicap Placard MISC; Starting Fri 2/17/2023, Disp-1 each, R-0, PrintExpiration in 5 years  Neuropathy in liver disease  -     Handicap Placard MISC; Starting Fri 2/17/2023, Disp-1 each, R-0, PrintExpiration in 5 years  Need for vaccination  -     Pneumococcal, PCV20, PREVNAR 21, (age 25 yrs+), IM, PF    Recommendations for Preventive Services Due: see orders and patient instructions/AVS.  Recommended screening schedule for the next 5-10 years is provided to the patient in written form: see Patient Instructions/AVS.     Return in 6 months (on 8/17/2023).      Subjective   Chief Complaint   Patient presents with    Medicare AWV     Patient here for follow-up of Placed second call to [employer], patient in pre-op now, did not receive call back from rep, previously called at [phone]. Awaiting call back.  Received call back from rep Yessy from [employer] with instructions if using any electrical cautery to place magnet over pacemaker, if not using any electrical cautery nothing further indicated regarding pacemaker.   chronic conditions. Liver cirrhosis --appears stable. No swelling in legs. Some abdominal bloating or swelling off and on. Patient has chosen not to follow-up with GI. Stays active. 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 Missed 1 word is testing. Stutters at times. Not having word finding isues. Not forgetful in shopping or driving. Patient has a history of positive Cologuard. Colonoscopy was recommended and GI consultation was carried out. She does not want to do a colonoscopy. She feels her weight stable and does not have any need for invasive procedures. Patient's complete Health Risk Assessment and screening values have been reviewed and are found in Flowsheets. The following problems were reviewed today and where indicated follow up appointments were made and/or referrals ordered. Positive Risk Factor Screenings with Interventions:       Cognitive: Words recalled: 2 Words Recalled           Total Score Interpretation: Abnormal Mini-Cog      Interventions:  See above             Weight and Activity:  Physical Activity: Inactive    Days of Exercise per Week: 0 days    Minutes of Exercise per Session: 0 min     On average, how many days per week do you engage in moderate to strenuous exercise (like a brisk walk)?: 0 days  Have you lost any weight without trying in the past 3 months?: No  Body mass index: (!) 27.98  Inactivity Interventions:  Overall stays active but does not have as scheduled workout plan                     Objective   Vitals:    02/17/23 1411   BP: 138/82   Site: Left Upper Arm   Position: Sitting   Cuff Size: Large Adult   Pulse: 85   Temp: 97.3 °F (36.3 °C)   SpO2: 99%   Weight: 153 lb (69.4 kg)   Height: 5' 2\" (1.575 m)      Body mass index is 27.98 kg/m². In general the patient is in no acute distress, awake and oriented. Today as she was expressing the need for handicap she stuttered quite a bit. She would find the word.   She stuttered Blessed for other discussions. Lungs appear to be clear and the heart regular rate and rhythm. Abdomen is soft but mild distention is noted. Extremities show no edema. No Known Allergies  Prior to Visit Medications    Medication Sig Taking?  Authorizing Provider   levothyroxine (SYNTHROID) 100 MCG tablet TAKE 1 po daily for 5 days then 1/2 tab po two days of the week Yes Lillian Gonzalez MD   furosemide (LASIX) 40 MG tablet TAKE 1 TABLET Margret Goldmann, MD   potassium chloride (KLOR-CON 10) 10 MEQ extended release tablet Take 3 tablets by mouth daily Yes Lillian Gonzalez MD   Handicap Placard MISC by Does not apply route Expiration in 5 years Yes Lillian Gonzalez MD       CareTe (Including outside providers/suppliers regularly involved in providing care):   Patient Care Team:  Lillian Gonzalez MD as PCP - General (Family Medicine)  Lillain Gonzalez MD as PCP - Empaneled Provider     Reviewed and updated this visit:  Tobacco  Allergies  Meds  Problems  Med Hx  Surg Hx  Soc Hx  Fam Hx               Lillian Gonzalez MD

## 2024-05-30 NOTE — TELEPHONE ENCOUNTER
----- Message from Que Aponte sent at 5/30/2024  8:38 AM CDT -----  Regarding: FW: Appointment Request  Contact: [phone]    ----- Message -----  From: Juan Luis Benitez  Sent: 5/30/2024   6:09 AM CDT  To: [address] Peds-Schedule Msg Pool  Subject: Appointment Request                              Appointment Request From: Juan Luis Benitez    With Provider: Glenda He MD [[address]]    Preferred Date Range: 5/31/2024 – 6/3/2024    Preferred Times: Any Time    Reason for visit: Injury/Illness    Comments:  I've noticed the Juan Luis is crying every time he has to pass gas (it was like that before the problem with his  intussusception) now is more pain full I don't know if I can get him to see a gastrointestinal Dr or his regular Dr     Appointment scheduled for tomorrow @ 10:45

## 2024-08-21 ENCOUNTER — OFFICE VISIT (OUTPATIENT)
Dept: FAMILY MEDICINE CLINIC | Age: 74
End: 2024-08-21

## 2024-08-21 ENCOUNTER — HOSPITAL ENCOUNTER (OUTPATIENT)
Dept: MAMMOGRAPHY | Age: 74
Discharge: HOME | End: 2024-08-21
Payer: COMMERCIAL

## 2024-08-21 VITALS
WEIGHT: 142 LBS | HEIGHT: 62 IN | RESPIRATION RATE: 18 BRPM | SYSTOLIC BLOOD PRESSURE: 136 MMHG | DIASTOLIC BLOOD PRESSURE: 70 MMHG | OXYGEN SATURATION: 99 % | HEART RATE: 87 BPM | BODY MASS INDEX: 26.13 KG/M2

## 2024-08-21 DIAGNOSIS — G99.0 NEUROPATHY IN LIVER DISEASE: Chronic | ICD-10-CM

## 2024-08-21 DIAGNOSIS — K74.60 CIRRHOSIS OF LIVER WITHOUT ASCITES, UNSPECIFIED HEPATIC CIRRHOSIS TYPE (HCC): ICD-10-CM

## 2024-08-21 DIAGNOSIS — R19.5 POSITIVE COLORECTAL CANCER SCREENING USING COLOGUARD TEST: Primary | ICD-10-CM

## 2024-08-21 DIAGNOSIS — K76.9 NEUROPATHY IN LIVER DISEASE: Chronic | ICD-10-CM

## 2024-08-21 DIAGNOSIS — Z12.39 BREAST SCREENING: ICD-10-CM

## 2024-08-21 DIAGNOSIS — E03.4 HYPOTHYROIDISM DUE TO ACQUIRED ATROPHY OF THYROID: ICD-10-CM

## 2024-08-21 PROCEDURE — 77063 BREAST TOMOSYNTHESIS BI: CPT

## 2024-08-21 ASSESSMENT — ENCOUNTER SYMPTOMS
CONSTIPATION: 0
SHORTNESS OF BREATH: 0
DIARRHEA: 0
BLOOD IN STOOL: 0
ABDOMINAL DISTENTION: 0
ABDOMINAL PAIN: 0

## 2024-08-21 NOTE — PROGRESS NOTES
Keshia Ramon (:  1950) is a 73 y.o. female,Established patient, here for evaluation of the following chief complaint(s):  Hypothyroidism      Assessment & Plan   ASSESSMENT/PLAN:  1. Positive colorectal cancer screening using Cologuard test -- refuses colonoscopy  -     Brenden Lara MD, Gastroenterology, [place]  2. Cirrhosis of liver without ascites, unspecified hepatic cirrhosis type (HCC)  -     Comprehensive Metabolic Panel; Future  -     Lipid Panel; Future  -     TSH with Reflex; Future  -     CBC with Auto Differential; Future  3. Neuropathy in liver disease  -     Comprehensive Metabolic Panel; Future  -     Lipid Panel; Future  -     TSH with Reflex; Future  -     CBC with Auto Differential; Future  4. Hypothyroidism due to acquired atrophy of thyroid  -     Comprehensive Metabolic Panel; Future  -     Lipid Panel; Future  -     TSH with Reflex; Future  -     CBC with Auto Differential; Future    Staff to call to set up GI appt.  Vaccines recommended  Labs due a week or so prior to next visit.   Continue with current medication    Return in about 6 months (around 2025) for AWV.         Subjective   SUBJECTIVE/OBJECTIVE:  HPI  + cologuard.  No melena/hematochezia/hemoptysis. Reports making effort for the GI referral but she didn't get a call back. There seems to be some communication issues with patient's baseline speech/cognition.  Compensated cirrhosis, stable.  Thyroid appears clinically stable.  Noted weight loss slowly over time. 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     Objective   Physical Exam   Gen: NAD, AAO x 3, coherent, pleasant   See NP student note         This office note may have been at least partially dictated. Effort was made to review for errors but some may have been missed. Please contact author of note for clarification if needed.     An electronic signature was used to authenticate this note.    --Sylvia Woody MD   
Palpations: Abdomen is soft.      Tenderness: There is no abdominal tenderness.   Skin:     General: Skin is warm and dry.   Neurological:      General: No focal deficit present.      Mental Status: She is alert and oriented to person, place, and time. Mental status is at baseline.                  An electronic signature was used to authenticate this note.    --Neil Lauren

## 2024-12-10 ENCOUNTER — HOSPITAL ENCOUNTER (OUTPATIENT)
Dept: GENERAL RADIOLOGY | Age: 74
Discharge: HOME | End: 2024-12-10
Payer: COMMERCIAL

## 2024-12-10 ENCOUNTER — HOSPITAL ENCOUNTER (OUTPATIENT)
Age: 74
Discharge: HOME | End: 2024-12-10
Payer: COMMERCIAL

## 2024-12-10 DIAGNOSIS — Z01.818 PREOP EXAMINATION: ICD-10-CM

## 2024-12-10 LAB
ALBUMIN SERPL BCG-MCNC: 3.8 G/DL (ref 3.5–5.1)
ALP SERPL-CCNC: 129 U/L (ref 38–126)
ALT SERPL W/O P-5'-P-CCNC: 29 U/L (ref 11–66)
ANION GAP SERPL CALC-SCNC: 11 MEQ/L (ref 8–16)
AST SERPL-CCNC: 39 U/L (ref 5–40)
BILIRUB SERPL-MCNC: 0.5 MG/DL (ref 0.3–1.2)
BUN SERPL-MCNC: 11 MG/DL (ref 7–22)
CALCIUM SERPL-MCNC: 9.4 MG/DL (ref 8.5–10.5)
CHLORIDE SERPL-SCNC: 104 MEQ/L (ref 98–111)
CO2 SERPL-SCNC: 25 MEQ/L (ref 23–33)
CREAT SERPL-MCNC: 0.7 MG/DL (ref 0.4–1.2)
DEPRECATED RDW RBC AUTO: 45.3 FL (ref 35–45)
EKG ATRIAL RATE: 73 BPM
EKG P AXIS: 44 DEGREES
EKG P-R INTERVAL: 154 MS
EKG Q-T INTERVAL: 398 MS
EKG QRS DURATION: 84 MS
EKG QTC CALCULATION (BAZETT): 438 MS
EKG R AXIS: 13 DEGREES
EKG T AXIS: 34 DEGREES
EKG VENTRICULAR RATE: 73 BPM
ERYTHROCYTE [DISTWIDTH] IN BLOOD BY AUTOMATED COUNT: 13.2 % (ref 11.5–14.5)
GFR SERPL CREATININE-BSD FRML MDRD: 89 ML/MIN/1.73M2
GLUCOSE SERPL-MCNC: 94 MG/DL (ref 70–108)
HCT VFR BLD AUTO: 38.4 % (ref 37–47)
HGB BLD-MCNC: 12.3 GM/DL (ref 12–16)
MCH RBC QN AUTO: 30.2 PG (ref 26–33)
MCHC RBC AUTO-ENTMCNC: 32 GM/DL (ref 32.2–35.5)
MCV RBC AUTO: 94.3 FL (ref 81–99)
PLATELET # BLD AUTO: 136 THOU/MM3 (ref 130–400)
PMV BLD AUTO: 11.1 FL (ref 9.4–12.4)
POTASSIUM SERPL-SCNC: 4.7 MEQ/L (ref 3.5–5.2)
PROT SERPL-MCNC: 7.5 G/DL (ref 6.1–8)
RBC # BLD AUTO: 4.07 MILL/MM3 (ref 4.2–5.4)
SODIUM SERPL-SCNC: 140 MEQ/L (ref 135–145)
WBC # BLD AUTO: 4.8 THOU/MM3 (ref 4.8–10.8)

## 2024-12-10 PROCEDURE — 93010 ELECTROCARDIOGRAM REPORT: CPT | Performed by: NUCLEAR MEDICINE

## 2024-12-10 PROCEDURE — 93005 ELECTROCARDIOGRAM TRACING: CPT | Performed by: PODIATRIST

## 2024-12-10 PROCEDURE — 85027 COMPLETE CBC AUTOMATED: CPT

## 2024-12-10 PROCEDURE — 71046 X-RAY EXAM CHEST 2 VIEWS: CPT

## 2024-12-10 PROCEDURE — 36415 COLL VENOUS BLD VENIPUNCTURE: CPT

## 2024-12-10 PROCEDURE — 80053 COMPREHEN METABOLIC PANEL: CPT

## 2024-12-11 LAB
EKG ATRIAL RATE: 67 BPM
EKG P AXIS: 54 DEGREES
EKG P-R INTERVAL: 158 MS
EKG Q-T INTERVAL: 412 MS
EKG QRS DURATION: 86 MS
EKG QTC CALCULATION (BAZETT): 435 MS
EKG R AXIS: 8 DEGREES
EKG T AXIS: 42 DEGREES
EKG VENTRICULAR RATE: 67 BPM

## 2024-12-11 PROCEDURE — 93010 ELECTROCARDIOGRAM REPORT: CPT | Performed by: NUCLEAR MEDICINE

## 2024-12-11 RX ORDER — LEVOTHYROXINE SODIUM 100 UG/1
100 TABLET ORAL
COMMUNITY

## 2024-12-11 NOTE — PROGRESS NOTES
In preparation for their surgical procedure above patient was screened for Obstructive Sleep Apnea (BRIDGER) using the STOP-Bang Questionnaire by the Pre-Admission Testing department.  This is a pre-surgical screening tool for patient safety and serves as a recommendation, this WILL NOT cause cancellation of surgery.    STOP-Bang Questionnaire  * Do you currently see a pulmonologist?  No     If yes STOP, do not complete.  Patient follows with Dr.     1.  Do you snore loudly (able to be heard in the next room)?      No    2.  Do you often feel tired or sleepy during the daytime?          No       3.  Has anyone ever told you that you stop breathing during your sleep?       No    4.  Do you have or are you being treated for high blood pressure?          No      5.  BMI more than 35?  BMI (Calculated): 27.5        No    6.  Age over 50 years? 74 y.o.      Yes    7.  Neck Circumference greater than 17 inches for male or 16 inches for female?  Measured           (visits only)            Not Applicable    8.  Gender Male?                 No      TOTAL SCORE: 1    BRIDGER - Low Risk : Yes to 0 - 2 questions  BRIDGER - Intermediate Risk : Yes to 3 - 4 questions  BRIDGER - High Risk : Yes to 5 - 8 questions    Adapted from:   STOP Questionnaire: A Tool to Screen Patients for Obstructive Sleep Apnea   JAMES Rocha.R.C.P.C., Ameya Good M.B.B.S., Molly Sampson M.D., Yessenia Handy, Ph.D., JAMES Enrique.B.B.S., JAMES Ron.Sc., Gus Higuera M.D., Yovanny Black F.R.C.P.C.   Anesthesiology 2008; 108:812-21 Copyright 2008, the American Society of Anesthesiologists, Inc. [employer]   ----------------------------------------------------------------------------------------------------------------

## 2024-12-11 NOTE — PROGRESS NOTES
NPO after midnight (may have 8 oz of water up to 2 hours before surgery)  Bring insurance info and drivers license  Wear comfortable clean clothing  Do not bring jewelry  Shower night before and morning of surgery with a liquid antibacterial soap  Bring list of medications with dosage and how often taken  Follow all instructions given by your physician   needed at discharge  You must have a responsible adult with you day of surgery and for 24 hours after surgery  Call [phone] for any questions

## 2024-12-12 ENCOUNTER — OFFICE VISIT (OUTPATIENT)
Dept: FAMILY MEDICINE CLINIC | Age: 74
End: 2024-12-12
Payer: COMMERCIAL

## 2024-12-12 VITALS
HEART RATE: 81 BPM | BODY MASS INDEX: 27.05 KG/M2 | RESPIRATION RATE: 16 BRPM | WEIGHT: 147 LBS | OXYGEN SATURATION: 99 % | HEIGHT: 62 IN | SYSTOLIC BLOOD PRESSURE: 134 MMHG | DIASTOLIC BLOOD PRESSURE: 64 MMHG

## 2024-12-12 DIAGNOSIS — Z01.818 PREOP EXAMINATION: Primary | ICD-10-CM

## 2024-12-12 DIAGNOSIS — Z89.432 PARTIAL NONTRAUMATIC AMPUTATION OF LEFT FOOT (HCC): Chronic | ICD-10-CM

## 2024-12-12 DIAGNOSIS — M86.9 OSTEOMYELITIS OF LEFT FOOT, UNSPECIFIED TYPE: ICD-10-CM

## 2024-12-12 PROCEDURE — 1123F ACP DISCUSS/DSCN MKR DOCD: CPT | Performed by: NURSE PRACTITIONER

## 2024-12-12 PROCEDURE — 1159F MED LIST DOCD IN RCRD: CPT | Performed by: NURSE PRACTITIONER

## 2024-12-12 PROCEDURE — 1160F RVW MEDS BY RX/DR IN RCRD: CPT | Performed by: NURSE PRACTITIONER

## 2024-12-12 PROCEDURE — 99214 OFFICE O/P EST MOD 30 MIN: CPT | Performed by: NURSE PRACTITIONER

## 2024-12-12 ASSESSMENT — ENCOUNTER SYMPTOMS
EYE PAIN: 0
SHORTNESS OF BREATH: 0
NAUSEA: 0
VOMITING: 0
CHEST TIGHTNESS: 0
COUGH: 0
ABDOMINAL PAIN: 0
SORE THROAT: 0

## 2024-12-12 NOTE — PROGRESS NOTES
motion.      Cervical back: Neck supple.      Right lower leg: No edema.      Left lower leg: Edema (trace) present.   Skin:     General: Skin is warm and dry.      Findings: No rash.   Neurological:      General: No focal deficit present.      Mental Status: She is alert and oriented to person, place, and time.   Psychiatric:         Mood and Affect: Mood normal.         Behavior: Behavior normal.         Thought Content: Thought content normal.         Judgment: Judgment normal.           Immunization History   Administered Date(s) Administered    PPD Test 01/10/2013    Pneumococcal, PCV-13, PREVNAR 13, (age 6w+), IM, 0.5mL 04/28/2017    Pneumococcal, PCV20, PREVNAR 20, (age 6w+), IM, 0.5mL 02/17/2023       ASA classification:   1  Class 1: A normal healthy patient  Class 2: Pt with mild to moderate systemic disease  Class 3: Severe systemic disease or disturbance  Class 4: Severe systemic disorders that are already life threatening.  Class 5: Moribund pt with little chances of survival, for more than 24 hours.      Mallampati I Airway Classification:   [x]1 []2 []3 []4    ASSESSMENT       Diagnosis Orders   1. Preop examination        2. Partial nontraumatic amputation of left foot (HCC)        3. Osteomyelitis of left foot, unspecified type            PLAN     Acceptable risk for surgery -- Yes  Mild, Moderate or Severe risk -- Moderate   Contraindications for surgery -- None    Wendi-operative medication instructions:  Given by surgeon's office -- No  Other recommendations -- Can hold lasix and potassium that day.  Continue current medications otherwise.     Return for Regular follow up as scheduled and as needed.          Electronically signed by JARED Elder CNP on 12/12/2024 at 10:32 AM

## 2024-12-17 ENCOUNTER — TELEPHONE (OUTPATIENT)
Dept: FAMILY MEDICINE CLINIC | Age: 74
End: 2024-12-17

## 2024-12-17 NOTE — TELEPHONE ENCOUNTER
Call received from [facility] regarding preop clearance  Patient scheduled for surgery 12/18/24 but they have not received   Fax confirmation 12/12/24    Refaxed as requested  Fax confirmation received

## 2024-12-18 ENCOUNTER — HOSPITAL ENCOUNTER (OUTPATIENT)
Age: 74
Setting detail: OUTPATIENT SURGERY
Discharge: HOME | End: 2024-12-18
Attending: PODIATRIST | Admitting: PODIATRIST
Payer: COMMERCIAL

## 2024-12-18 ENCOUNTER — ANESTHESIA EVENT (OUTPATIENT)
Dept: OPERATING ROOM | Age: 74
End: 2024-12-18
Payer: COMMERCIAL

## 2024-12-18 ENCOUNTER — ANESTHESIA (OUTPATIENT)
Dept: OPERATING ROOM | Age: 74
End: 2024-12-18
Payer: COMMERCIAL

## 2024-12-18 VITALS
BODY MASS INDEX: 26.87 KG/M2 | HEIGHT: 62 IN | HEART RATE: 70 BPM | TEMPERATURE: 98 F | WEIGHT: 146 LBS | OXYGEN SATURATION: 95 % | RESPIRATION RATE: 13 BRPM | SYSTOLIC BLOOD PRESSURE: 128 MMHG | DIASTOLIC BLOOD PRESSURE: 60 MMHG

## 2024-12-18 PROCEDURE — 3700000001 HC ADD 15 MINUTES (ANESTHESIA): Performed by: PODIATRIST

## 2024-12-18 PROCEDURE — 3700000000 HC ANESTHESIA ATTENDED CARE: Performed by: PODIATRIST

## 2024-12-18 PROCEDURE — 6360000002 HC RX W HCPCS: Performed by: PODIATRIST

## 2024-12-18 PROCEDURE — 3600000013 HC SURGERY LEVEL 3 ADDTL 15MIN: Performed by: PODIATRIST

## 2024-12-18 PROCEDURE — 7100000000 HC PACU RECOVERY - FIRST 15 MIN: Performed by: PODIATRIST

## 2024-12-18 PROCEDURE — 7100000010 HC PHASE II RECOVERY - FIRST 15 MIN: Performed by: PODIATRIST

## 2024-12-18 PROCEDURE — 7100000001 HC PACU RECOVERY - ADDTL 15 MIN: Performed by: PODIATRIST

## 2024-12-18 PROCEDURE — 2500000003 HC RX 250 WO HCPCS

## 2024-12-18 PROCEDURE — 2709999900 HC NON-CHARGEABLE SUPPLY: Performed by: PODIATRIST

## 2024-12-18 PROCEDURE — 3600000003 HC SURGERY LEVEL 3 BASE: Performed by: PODIATRIST

## 2024-12-18 PROCEDURE — 7100000011 HC PHASE II RECOVERY - ADDTL 15 MIN: Performed by: PODIATRIST

## 2024-12-18 PROCEDURE — 6360000002 HC RX W HCPCS

## 2024-12-18 PROCEDURE — 6360000002 HC RX W HCPCS: Performed by: NURSE ANESTHETIST, CERTIFIED REGISTERED

## 2024-12-18 RX ORDER — ONDANSETRON 4 MG/1
4 TABLET, ORALLY DISINTEGRATING ORAL EVERY 8 HOURS PRN
Status: DISCONTINUED | OUTPATIENT
Start: 2024-12-18 | End: 2024-12-18 | Stop reason: HOSPADM

## 2024-12-18 RX ORDER — SODIUM CHLORIDE 9 MG/ML
INJECTION, SOLUTION INTRAVENOUS PRN
Status: DISCONTINUED | OUTPATIENT
Start: 2024-12-18 | End: 2024-12-18 | Stop reason: HOSPADM

## 2024-12-18 RX ORDER — OXYCODONE HYDROCHLORIDE 5 MG/1
5 TABLET ORAL EVERY 4 HOURS PRN
Status: DISCONTINUED | OUTPATIENT
Start: 2024-12-18 | End: 2024-12-18 | Stop reason: HOSPADM

## 2024-12-18 RX ORDER — ONDANSETRON 2 MG/ML
4 INJECTION INTRAMUSCULAR; INTRAVENOUS
Status: DISCONTINUED | OUTPATIENT
Start: 2024-12-18 | End: 2024-12-18 | Stop reason: HOSPADM

## 2024-12-18 RX ORDER — LIDOCAINE HYDROCHLORIDE 20 MG/ML
INJECTION, SOLUTION INFILTRATION; PERINEURAL
Status: DISCONTINUED | OUTPATIENT
Start: 2024-12-18 | End: 2024-12-18 | Stop reason: SDUPTHER

## 2024-12-18 RX ORDER — FENTANYL CITRATE 50 UG/ML
50 INJECTION, SOLUTION INTRAMUSCULAR; INTRAVENOUS EVERY 5 MIN PRN
Status: DISCONTINUED | OUTPATIENT
Start: 2024-12-18 | End: 2024-12-18 | Stop reason: HOSPADM

## 2024-12-18 RX ORDER — SODIUM CHLORIDE 0.9 % (FLUSH) 0.9 %
5-40 SYRINGE (ML) INJECTION EVERY 12 HOURS SCHEDULED
Status: DISCONTINUED | OUTPATIENT
Start: 2024-12-18 | End: 2024-12-18 | Stop reason: HOSPADM

## 2024-12-18 RX ORDER — SODIUM CHLORIDE 9 MG/ML
INJECTION, SOLUTION INTRAVENOUS CONTINUOUS
Status: DISCONTINUED | OUTPATIENT
Start: 2024-12-18 | End: 2024-12-18

## 2024-12-18 RX ORDER — NALOXONE HYDROCHLORIDE 0.4 MG/ML
INJECTION, SOLUTION INTRAMUSCULAR; INTRAVENOUS; SUBCUTANEOUS PRN
Status: DISCONTINUED | OUTPATIENT
Start: 2024-12-18 | End: 2024-12-18 | Stop reason: HOSPADM

## 2024-12-18 RX ORDER — SODIUM CHLORIDE 9 MG/ML
INJECTION, SOLUTION INTRAVENOUS CONTINUOUS
Status: DISCONTINUED | OUTPATIENT
Start: 2024-12-18 | End: 2024-12-18 | Stop reason: HOSPADM

## 2024-12-18 RX ORDER — DIPHENHYDRAMINE HYDROCHLORIDE 50 MG/ML
12.5 INJECTION INTRAMUSCULAR; INTRAVENOUS
Status: DISCONTINUED | OUTPATIENT
Start: 2024-12-18 | End: 2024-12-18 | Stop reason: HOSPADM

## 2024-12-18 RX ORDER — ONDANSETRON 2 MG/ML
4 INJECTION INTRAMUSCULAR; INTRAVENOUS EVERY 6 HOURS PRN
Status: DISCONTINUED | OUTPATIENT
Start: 2024-12-18 | End: 2024-12-18 | Stop reason: HOSPADM

## 2024-12-18 RX ORDER — DEXAMETHASONE SODIUM PHOSPHATE 10 MG/ML
INJECTION, EMULSION INTRAMUSCULAR; INTRAVENOUS
Status: DISCONTINUED | OUTPATIENT
Start: 2024-12-18 | End: 2024-12-18 | Stop reason: SDUPTHER

## 2024-12-18 RX ORDER — SODIUM CHLORIDE 0.9 % (FLUSH) 0.9 %
5-40 SYRINGE (ML) INJECTION PRN
Status: DISCONTINUED | OUTPATIENT
Start: 2024-12-18 | End: 2024-12-18 | Stop reason: HOSPADM

## 2024-12-18 RX ORDER — MORPHINE SULFATE 2 MG/ML
2 INJECTION, SOLUTION INTRAMUSCULAR; INTRAVENOUS
Status: DISCONTINUED | OUTPATIENT
Start: 2024-12-18 | End: 2024-12-18 | Stop reason: HOSPADM

## 2024-12-18 RX ORDER — ONDANSETRON 2 MG/ML
INJECTION INTRAMUSCULAR; INTRAVENOUS
Status: DISCONTINUED | OUTPATIENT
Start: 2024-12-18 | End: 2024-12-18 | Stop reason: SDUPTHER

## 2024-12-18 RX ORDER — PROPOFOL 10 MG/ML
INJECTION, EMULSION INTRAVENOUS
Status: DISCONTINUED | OUTPATIENT
Start: 2024-12-18 | End: 2024-12-18 | Stop reason: SDUPTHER

## 2024-12-18 RX ADMIN — PROPOFOL 120 MG: 10 INJECTION, EMULSION INTRAVENOUS at 14:59

## 2024-12-18 RX ADMIN — ONDANSETRON 4 MG: 2 INJECTION INTRAMUSCULAR; INTRAVENOUS at 15:08

## 2024-12-18 RX ADMIN — LIDOCAINE HYDROCHLORIDE 80 MG: 20 INJECTION, SOLUTION INFILTRATION; PERINEURAL at 14:59

## 2024-12-18 RX ADMIN — WATER 2000 MG: 1 INJECTION INTRAMUSCULAR; INTRAVENOUS; SUBCUTANEOUS at 15:05

## 2024-12-18 RX ADMIN — DEXAMETHASONE SODIUM PHOSPHATE 5 MG: 10 INJECTION, EMULSION INTRAMUSCULAR; INTRAVENOUS at 15:05

## 2024-12-18 ASSESSMENT — PAIN - FUNCTIONAL ASSESSMENT: PAIN_FUNCTIONAL_ASSESSMENT: 0-10

## 2024-12-18 ASSESSMENT — LIFESTYLE VARIABLES: SMOKING_STATUS: 0

## 2024-12-18 NOTE — H&P
[facility]  History and Physical Update    Pt Name: Keshia Ramon  MRN: [id number]  YOB: 1950  Date of evaluation: 12/18/2024    [x] I have examined the patient and reviewed the H&P/Consult and there are no changes to the patient or plans.    [] I have examined the patient and reviewed the H&P/Consult and have noted the following changes:        Remy Arevalo DPM DPM  Electronically signed 12/18/2024 at 12:58 PM

## 2024-12-18 NOTE — PROGRESS NOTES
1537-Patient in Phase I. Report received from surgical RN and Dr Danielle. Patient resting with eyes closed. Placed on bedside monitor, vital signs obtained and stable. Dressing dry and intact to left foot with boot on. IV infusing at KVO    1540-Pt continues to rest with eyes closed.VSS    1545-Pt awake, denies pain or nausea. VSS    1550-Pt resting, denies need.     1555-Pt resting, denies needs. VSS.     1600-Patient awake, VSS.     1605-Pt awake and denies needs    1607-criteria met for transfer to Phase II.     1610-Pt in Phase II, family at bedside. Snack and drink provided. Call light in hand. Patient denies pain medications.     1620-IV removed, pt sitting on the side of the bed and tolerating well. Discharge instructions provided to patient and her sister, both voiced understanding.     1637-Pt discharged home in stable condition. All belongings sent. Taken to car via wheelchair and home with family.

## 2024-12-18 NOTE — ANESTHESIA PRE PROCEDURE
05:31 AM     12/10/2024 09:24 AM       CMP:   Lab Results   Component Value Date/Time     12/10/2024 09:24 AM    K 4.7 12/10/2024 09:24 AM     12/10/2024 09:24 AM    CO2 25 12/10/2024 09:24 AM    BUN 11 12/10/2024 09:24 AM    CREATININE 0.7 12/10/2024 09:24 AM    LABGLOM 89 12/10/2024 09:24 AM    LABGLOM >60 02/22/2024 07:15 AM    GLUCOSE 94 12/10/2024 09:24 AM    GLUCOSE 129 01/27/2015 07:46 AM    CALCIUM 9.4 12/10/2024 09:24 AM    BILITOT 0.5 12/10/2024 09:24 AM    ALKPHOS 129 12/10/2024 09:24 AM    AST 39 12/10/2024 09:24 AM    ALT 29 12/10/2024 09:24 AM       POC Tests: No results for input(s): \"POCGLU\", \"POCNA\", \"POCK\", \"POCCL\", \"POCBUN\", \"POCHEMO\", \"POCHCT\" in the last 72 hours.    Coags:   Lab Results   Component Value Date/Time    INR 0.96 02/16/2022 07:32 AM    APTT 33.6 02/16/2022 07:32 AM       HCG (If Applicable): No results found for: \"PREGTESTUR\", \"PREGSERUM\", \"HCG\", \"HCGQUANT\"     ABGs: No results found for: \"PHART\", \"PO2ART\", \"BTI4HPI\", \"AMA8TCT\", \"BEART\", \"B4TVYYND\"     Type & Screen (If Applicable):  Lab Results   Component Value Date    ABORH O 02/07/2017    LABANTI NEG 02/07/2017       Drug/Infectious Status (If Applicable):  Lab Results   Component Value Date/Time    HEPCAB see below 01/10/2013 04:41 PM       COVID-19 Screening (If Applicable): No results found for: \"COVID19\"        Anesthesia Evaluation  Patient summary reviewed   no history of anesthetic complications:   Airway: Mallampati: II          Dental:    (+) upper dentures and lower dentures      Pulmonary:normal exam        (-) COPD, asthma and not a current smoker                           Cardiovascular:  Exercise tolerance: good (>4 METS)      (-) hypertension, past MI and dysrhythmias                Neuro/Psych:   (+) neuromuscular disease (degenerative disc disease):   (-) seizures and CVA           GI/Hepatic/Renal:   (+) hepatitis (treated 10 years ago): C, liver disease:     (-) GERD and no renal

## 2024-12-18 NOTE — ANESTHESIA POSTPROCEDURE EVALUATION
Department of Anesthesiology  Postprocedure Note    Patient: Keshia Ramon  MRN: [id number]  YOB: 1950  Date of evaluation: 12/18/2024    Procedure Summary       Date: 12/18/24 Room / Location: [address]    Anesthesia Start: 1455 Anesthesia Stop: 1540    Procedure: Hallux Amputation at the Metatarsal Phalangeal Joint Left Foot (Toes) Diagnosis:       Other acute osteomyelitis, unspecified site      Pressure ulcer of other site, stage 4 (HCC)      (Other acute osteomyelitis, unspecified site [M86.10])      (Pressure ulcer of other site, stage 4 (HCC) [L89.894])    Surgeons: Remy Arevalo DPM Responsible Provider: Rik Danielle DO    Anesthesia Type: general ASA Status: 2            Anesthesia Type: No value filed.    Malika Phase I:      Malika Phase II:      Anesthesia Post Evaluation    Patient location during evaluation: PACU  Patient participation: complete - patient participated  Level of consciousness: sleepy but conscious  Airway patency: patent  Nausea & Vomiting: no vomiting and no nausea  Cardiovascular status: hemodynamically stable  Respiratory status: acceptable  Hydration status: stable  Pain management: adequate    No notable events documented.

## 2024-12-18 NOTE — PROGRESS NOTES
Pt. Admitted in stable condition. Consent signed. VS obtained and WNL.  Pt. Denies all other needs. Warm blanket provided. Call light left within reach.

## 2024-12-18 NOTE — DISCHARGE INSTRUCTIONS
Post Op Instructions    Pt Name: Keshia Ramon  Medical Record Number: [id number]  Today's Date: 12/18/2024    GENERAL ANESTHESIA OR SEDATION  1. Do not drive or operate hazardous machinery for 24 hours.  2. Do not make important business or personal decisions for 24 hours.  3. Do not drink alcoholic beverages or use tobacco for 24 hours.    ACTIVITY INSTRUCTIONS:  [] Rest today. Resume light to normal activity tomorrow.   [x] You may ambulate as tolerated in your post op shoe/boot.  [x] No weight is to be placed on the operative limb.  Use crutches, walker, Roll-a-bout as needed.  [x]Elevate the operative limb as much as possible to reduce swelling and discomfort for the first 72 hours      DIET INSTRUCTIONS:  []Begin with clear liquids. If not nauseated, may increase to a low-fat diet when you desire.   [x]Regular diet as tolerated.  []Other:     MEDICATIONS  [x]Prescription sent with you to be used as directed.   Do not drink alcohol or drive while taking these medications. You may experience dizziness or drowsiness with these medications. You may also experience constipation which can be relieved with stool softners or laxatives.  [x]You may resume your daily prescription medication schedule unless otherwise specified.  [x]If taking 325mg Aspirin or other blood thinners such as Coumadin or Plavix, you may resume tomorrow, unless told otherwise.     WOUND/DRESSING INSTRUCTIONS:  Always ensure you and your care giver clean hands before and after caring for the wound.  [x] Keep dressing clean and dry until you are seen in the offfice      [x] Ice operative site for 20 minutes 4 times a day.   - you may apply ice bag behind the knee or directly on the foot/ankle bandage.   - do not apply ice directly to the skin  [x] You may loosen the ACE wrap if it feels too tight, but do not disturb the underlying white gauze wrap.      FOLLOW-UP CARE. SPECIFICALLY WATCH FOR:   Fever over 101 degrees by mouth   Increased

## 2024-12-18 NOTE — BRIEF OP NOTE
Brief Postoperative Note      Patient: Keshia Ramon  YOB: 1950  MRN: [id number]    Date of Procedure: 12/18/2024    Pre-Op Diagnosis Codes:      * Other acute osteomyelitis, unspecified site [M86.10]     * Pressure ulcer of other site, stage 4 (HCC) [L89.894]    Post-Op Diagnosis: Same       Procedure(s):  Hallux Amputation at the Metatarsal Phalangeal Joint Left Foot    Surgeon(s):  Remy Arevalo DPM    Assistant:  Resident: Vadim Bruner DPM; Russell Head DPM    Anesthesia: General    Estimated Blood Loss (mL): Minimal    Complications: None    Specimens:   * No specimens in log *    Implants:  * No implants in log *      Drains: * No LDAs found *    Hemostasis: Ankle tourniquet at 250 mmHg    Injectables: 20 cc of 1/1 ratio 1% lidocaine with epi/half percent Marcaine     Materials: 4-0 Prolene    Findings:  Infection Present At Time Of Surgery (PATOS) (choose all levels that have infection present):  - Deep Infection (muscle/fascia) present as evidenced by fluid consistent with infection  Other Findings:     Electronically signed by Vadim Bruner DPM on 12/18/2024 at 3:38 PM

## 2024-12-18 NOTE — OP NOTE
Operative Note        Patient: Keshia Ramon  YOB: 1950  MRN: [id number]    Date of Procedure: 12/18/2024    Pre-Op Diagnosis Codes:      * Other acute osteomyelitis, unspecified site [M86.10]     * Pressure ulcer of other site, stage 4 (HCC) [L89.894]    Post-Op Diagnosis: Same       Procedure(s):  Hallux Amputation at the Metatarsal Phalangeal Joint Left Foot    Surgeon(s):  Remy Arevalo DPM    Assistant:  Resident: Vadim Bruner DPM; Russell Head DPM    Anesthesia: General    Estimated Blood Loss (mL): Minimal    Complications: None    Specimens:   * No specimens in log *    Implants:  * No implants in log *      Drains: * No LDAs found *    Hemostasis: Ankle tourniquet at 250 mmHg    Injectables: 20 cc of 1/1 ratio 1% lidocaine with epi/half percent Marcaine     Materials: 4-0 Prolene    Findings:  Infection Present At Time Of Surgery (PATOS) (choose all levels that have infection present):  - Deep Infection (muscle/fascia) present as evidenced by fluid consistent with infection  Complications: NONE     Detailed Description of Procedure:   Complications: NONE    Indications: Patient is a 74y.o. female with a chief complaint of infected ulceration who is being seen by Dr. Arevalo and being treated for infection.  At this time all conservative options have been exhausted and surgical intervention is necessary.  The procedure has been explained to the patient and they understand the risks, benefits and possible complications including recurrence, future infection, wound dehiscence.  No promises have been made as to surgical outcome.    Procedure:   The patient was transported from the pre-op holding to the operating room and placed in a supine position.  A pneumatic ankle tourniquet was applied to the left lower extremity.   The left foot was then prepped and draped in the normal aspetic manner.  The left foot was then exsanguinated with an esmark and the tourniquet was inflated to 250

## 2025-02-10 ENCOUNTER — HOSPITAL ENCOUNTER (OUTPATIENT)
Age: 75
Discharge: HOME | End: 2025-02-10
Payer: COMMERCIAL

## 2025-02-10 DIAGNOSIS — E03.4 HYPOTHYROIDISM DUE TO ACQUIRED ATROPHY OF THYROID: ICD-10-CM

## 2025-02-10 DIAGNOSIS — K76.9 NEUROPATHY IN LIVER DISEASE: Chronic | ICD-10-CM

## 2025-02-10 DIAGNOSIS — G99.0 NEUROPATHY IN LIVER DISEASE: Chronic | ICD-10-CM

## 2025-02-10 DIAGNOSIS — K74.60 CIRRHOSIS OF LIVER WITHOUT ASCITES, UNSPECIFIED HEPATIC CIRRHOSIS TYPE (HCC): ICD-10-CM

## 2025-02-10 LAB
ALBUMIN SERPL BCG-MCNC: 3.9 G/DL (ref 3.5–5.1)
ALP SERPL-CCNC: 139 U/L (ref 38–126)
ALT SERPL W/O P-5'-P-CCNC: 43 U/L (ref 11–66)
ANION GAP SERPL CALC-SCNC: 17 MEQ/L (ref 8–16)
AST SERPL-CCNC: 55 U/L (ref 5–40)
BASOPHILS ABSOLUTE: 0 THOU/MM3 (ref 0–0.1)
BASOPHILS NFR BLD AUTO: 0.3 %
BILIRUB SERPL-MCNC: 0.6 MG/DL (ref 0.3–1.2)
BUN SERPL-MCNC: 10 MG/DL (ref 7–22)
CALCIUM SERPL-MCNC: 9.2 MG/DL (ref 8.5–10.5)
CHLORIDE SERPL-SCNC: 101 MEQ/L (ref 98–111)
CHOLEST SERPL-MCNC: 138 MG/DL (ref 100–199)
CO2 SERPL-SCNC: 24 MEQ/L (ref 23–33)
CREAT SERPL-MCNC: 0.7 MG/DL (ref 0.4–1.2)
DEPRECATED RDW RBC AUTO: 46 FL (ref 35–45)
EOSINOPHIL NFR BLD AUTO: 3 %
EOSINOPHILS ABSOLUTE: 0.1 THOU/MM3 (ref 0–0.4)
ERYTHROCYTE [DISTWIDTH] IN BLOOD BY AUTOMATED COUNT: 13.5 % (ref 11.5–14.5)
GFR SERPL CREATININE-BSD FRML MDRD: 89 ML/MIN/1.73M2
GLUCOSE SERPL-MCNC: 107 MG/DL (ref 70–108)
HCT VFR BLD AUTO: 41.2 % (ref 37–47)
HDLC SERPL-MCNC: 43 MG/DL
HGB BLD-MCNC: 13.2 GM/DL (ref 12–16)
IMM GRANULOCYTES # BLD AUTO: 0.01 THOU/MM3 (ref 0–0.07)
IMM GRANULOCYTES NFR BLD AUTO: 0.3 %
LDLC SERPL CALC-MCNC: 75 MG/DL
LYMPHOCYTES ABSOLUTE: 1.6 THOU/MM3 (ref 1–4.8)
LYMPHOCYTES NFR BLD AUTO: 43.3 %
MCH RBC QN AUTO: 30 PG (ref 26–33)
MCHC RBC AUTO-ENTMCNC: 32 GM/DL (ref 32.2–35.5)
MCV RBC AUTO: 93.6 FL (ref 81–99)
MONOCYTES ABSOLUTE: 0.4 THOU/MM3 (ref 0.4–1.3)
MONOCYTES NFR BLD AUTO: 10.9 %
NEUTROPHILS ABSOLUTE: 1.6 THOU/MM3 (ref 1.8–7.7)
NEUTROPHILS NFR BLD AUTO: 42.2 %
NRBC BLD AUTO-RTO: 0 /100 WBC
PLATELET # BLD AUTO: 124 THOU/MM3 (ref 130–400)
PMV BLD AUTO: 11.5 FL (ref 9.4–12.4)
POTASSIUM SERPL-SCNC: 4.5 MEQ/L (ref 3.5–5.2)
PROT SERPL-MCNC: 7.3 G/DL (ref 6.1–8)
RBC # BLD AUTO: 4.4 MILL/MM3 (ref 4.2–5.4)
SODIUM SERPL-SCNC: 142 MEQ/L (ref 135–145)
TRIGL SERPL-MCNC: 98 MG/DL (ref 0–199)
TSH SERPL DL<=0.005 MIU/L-ACNC: 2.22 UIU/ML (ref 0.4–4.2)
WBC # BLD AUTO: 3.7 THOU/MM3 (ref 4.8–10.8)

## 2025-02-10 PROCEDURE — 36415 COLL VENOUS BLD VENIPUNCTURE: CPT

## 2025-02-10 PROCEDURE — 80053 COMPREHEN METABOLIC PANEL: CPT

## 2025-02-10 PROCEDURE — 85025 COMPLETE CBC W/AUTO DIFF WBC: CPT

## 2025-02-10 PROCEDURE — 84443 ASSAY THYROID STIM HORMONE: CPT

## 2025-02-10 PROCEDURE — 80061 LIPID PANEL: CPT

## 2025-02-27 ENCOUNTER — OFFICE VISIT (OUTPATIENT)
Dept: FAMILY MEDICINE CLINIC | Age: 75
End: 2025-02-27
Payer: COMMERCIAL

## 2025-02-27 VITALS
RESPIRATION RATE: 18 BRPM | HEART RATE: 87 BPM | BODY MASS INDEX: 26.87 KG/M2 | WEIGHT: 146 LBS | HEIGHT: 62 IN | DIASTOLIC BLOOD PRESSURE: 70 MMHG | OXYGEN SATURATION: 98 % | SYSTOLIC BLOOD PRESSURE: 126 MMHG

## 2025-02-27 DIAGNOSIS — K74.60 CIRRHOSIS OF LIVER WITHOUT ASCITES, UNSPECIFIED HEPATIC CIRRHOSIS TYPE (HCC): ICD-10-CM

## 2025-02-27 DIAGNOSIS — E03.4 HYPOTHYROIDISM DUE TO ACQUIRED ATROPHY OF THYROID: ICD-10-CM

## 2025-02-27 DIAGNOSIS — Z00.00 MEDICARE ANNUAL WELLNESS VISIT, SUBSEQUENT: Primary | ICD-10-CM

## 2025-02-27 PROCEDURE — 1123F ACP DISCUSS/DSCN MKR DOCD: CPT | Performed by: FAMILY MEDICINE

## 2025-02-27 PROCEDURE — 1159F MED LIST DOCD IN RCRD: CPT | Performed by: FAMILY MEDICINE

## 2025-02-27 PROCEDURE — G0439 PPPS, SUBSEQ VISIT: HCPCS | Performed by: FAMILY MEDICINE

## 2025-02-27 RX ORDER — POTASSIUM CHLORIDE 750 MG/1
30 TABLET, EXTENDED RELEASE ORAL DAILY
Qty: 270 TABLET | Refills: 3 | Status: SHIPPED | OUTPATIENT
Start: 2025-02-27

## 2025-02-27 RX ORDER — LEVOTHYROXINE SODIUM 100 UG/1
TABLET ORAL
Qty: 90 TABLET | Refills: 3 | Status: SHIPPED | OUTPATIENT
Start: 2025-02-27

## 2025-02-27 RX ORDER — FUROSEMIDE 40 MG/1
TABLET ORAL
Qty: 90 TABLET | Refills: 3 | Status: SHIPPED | OUTPATIENT
Start: 2025-02-27

## 2025-02-27 SDOH — ECONOMIC STABILITY: FOOD INSECURITY: WITHIN THE PAST 12 MONTHS, THE FOOD YOU BOUGHT JUST DIDN'T LAST AND YOU DIDN'T HAVE MONEY TO GET MORE.: NEVER TRUE

## 2025-02-27 SDOH — ECONOMIC STABILITY: FOOD INSECURITY: WITHIN THE PAST 12 MONTHS, YOU WORRIED THAT YOUR FOOD WOULD RUN OUT BEFORE YOU GOT MONEY TO BUY MORE.: NEVER TRUE

## 2025-02-27 ASSESSMENT — PATIENT HEALTH QUESTIONNAIRE - PHQ9
SUM OF ALL RESPONSES TO PHQ QUESTIONS 1-9: 0
2. FEELING DOWN, DEPRESSED OR HOPELESS: NOT AT ALL
SUM OF ALL RESPONSES TO PHQ9 QUESTIONS 1 & 2: 0
SUM OF ALL RESPONSES TO PHQ QUESTIONS 1-9: 0
1. LITTLE INTEREST OR PLEASURE IN DOING THINGS: NOT AT ALL
SUM OF ALL RESPONSES TO PHQ QUESTIONS 1-9: 0
SUM OF ALL RESPONSES TO PHQ QUESTIONS 1-9: 0

## 2025-02-27 NOTE — PROGRESS NOTES
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, unspecified hepatic cirrhosis type (HCC)  -     furosemide (LASIX) 40 MG tablet; TAKE 1 TABLET EVERY DAY, Disp-90 tablet, R-3Normal  -     potassium chloride (KLOR-CON 10) 10 MEQ extended release tablet; Take 3 tablets by mouth daily, Disp-270 tablet, R-3Normal  -     Gamma GT; Future  -     Protime-INR; Future  -     TSH reflex to FT4; Future  Hypothyroidism due to acquired atrophy of thyroid  -     levothyroxine (SYNTHROID) 100 MCG tablet; 1 tab Mon, Tues, Wed, Sat, Sun and take 1/2 tab Thurs and Fri, Disp-90 tablet, R-3Normal  -     CBC with Auto Differential; Future  -     Gamma GT; Future  -     Protime-INR; Future  -     TSH reflex to FT4; Future  -     Comprehensive Metabolic Panel; Future    Continue with current medication regimen  Lab due a week or so prior to next visit.  Liver ultrasound repeat will be discussed next visit.      Return in about 6 months (around 8/27/2025).     Subjective   History of Present Illness  The patient is a 74-year-old female presenting for a Medicare wellness visit. Overall, she is doing well.    She has a history of liver cirrhosis and reports no troubles with bowels, increased abdominal girth, or swelling. She does take Lasix and potassium daily. Blood pressure and heart rate are well maintained, and her weight has remained stable. 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 She also has not wanted to have referral to liver specialist. The last imaging of her liver was in February 2022.    She has a history of positive colorectal cancer due to Cologuard testing and has refused colonoscopy. She reports no bowel troubles.    She has a history of hepatitis C treatment but does not recall the year.    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

## 2025-06-23 ENCOUNTER — HOSPITAL ENCOUNTER (OUTPATIENT)
Age: 75
End: 2025-06-23

## 2025-08-04 ENCOUNTER — HOSPITAL ENCOUNTER (OUTPATIENT)
Age: 75
Discharge: HOME | End: 2025-08-04
Payer: COMMERCIAL

## 2025-08-04 DIAGNOSIS — E03.4 HYPOTHYROIDISM DUE TO ACQUIRED ATROPHY OF THYROID: ICD-10-CM

## 2025-08-04 DIAGNOSIS — K74.60 CIRRHOSIS OF LIVER WITHOUT ASCITES, UNSPECIFIED HEPATIC CIRRHOSIS TYPE (HCC): ICD-10-CM

## 2025-08-04 LAB
ALBUMIN SERPL BCG-MCNC: 4 G/DL (ref 3.4–4.9)
ALP SERPL-CCNC: 137 U/L (ref 38–126)
ALT SERPL W/O P-5'-P-CCNC: 39 U/L (ref 10–35)
ANION GAP SERPL CALC-SCNC: 13 MEQ/L (ref 8–16)
AST SERPL-CCNC: 44 U/L (ref 10–35)
BASOPHILS ABSOLUTE: 0 THOU/MM3 (ref 0–0.1)
BASOPHILS NFR BLD AUTO: 0.4 %
BILIRUB SERPL-MCNC: 0.6 MG/DL (ref 0.3–1.2)
BUN SERPL-MCNC: 10 MG/DL (ref 8–23)
CALCIUM SERPL-MCNC: 9.3 MG/DL (ref 8.8–10.2)
CHLORIDE SERPL-SCNC: 103 MEQ/L (ref 98–111)
CO2 SERPL-SCNC: 23 MEQ/L (ref 22–29)
CREAT SERPL-MCNC: 0.7 MG/DL (ref 0.5–0.9)
DEPRECATED RDW RBC AUTO: 45.4 FL (ref 35–45)
EOSINOPHIL NFR BLD AUTO: 1.1 %
EOSINOPHILS ABSOLUTE: 0.1 THOU/MM3 (ref 0–0.4)
ERYTHROCYTE [DISTWIDTH] IN BLOOD BY AUTOMATED COUNT: 13 % (ref 11.5–14.5)
GFR SERPL CREATININE-BSD FRML MDRD: 89 ML/MIN/1.73M2
GGT SERPL-CCNC: 54 U/L (ref 5–36)
GLUCOSE SERPL-MCNC: 81 MG/DL (ref 74–109)
HCT VFR BLD AUTO: 40.8 % (ref 37–47)
HGB BLD-MCNC: 13.4 GM/DL (ref 12–16)
IMM GRANULOCYTES # BLD AUTO: 0.01 THOU/MM3 (ref 0–0.07)
IMM GRANULOCYTES NFR BLD AUTO: 0.2 %
INR PPP: 0.95 (ref 0.85–1.13)
LYMPHOCYTES ABSOLUTE: 1.6 THOU/MM3 (ref 1–4.8)
LYMPHOCYTES NFR BLD AUTO: 34.2 %
MCH RBC QN AUTO: 31.5 PG (ref 26–33)
MCHC RBC AUTO-ENTMCNC: 32.8 GM/DL (ref 32.2–35.5)
MCV RBC AUTO: 95.8 FL (ref 81–99)
MONOCYTES ABSOLUTE: 0.5 THOU/MM3 (ref 0.4–1.3)
MONOCYTES NFR BLD AUTO: 10 %
NEUTROPHILS ABSOLUTE: 2.5 THOU/MM3 (ref 1.8–7.7)
NEUTROPHILS NFR BLD AUTO: 54.1 %
NRBC BLD AUTO-RTO: 0 /100 WBC
PLATELET # BLD AUTO: 98 THOU/MM3 (ref 130–400)
PLATELET BLD QL SMEAR: ABNORMAL
PMV BLD AUTO: 11.6 FL (ref 9.4–12.4)
POTASSIUM SERPL-SCNC: 4.1 MEQ/L (ref 3.5–5.2)
PROT SERPL-MCNC: 7.2 G/DL (ref 6.4–8.3)
PROTHROMBIN TIME: 11.1 SECONDS (ref 10–13.5)
RBC # BLD AUTO: 4.26 MILL/MM3 (ref 4.2–5.4)
SCAN OF BLOOD SMEAR: NORMAL
SODIUM SERPL-SCNC: 139 MEQ/L (ref 135–145)
TSH SERPL DL<=0.05 MIU/L-ACNC: 1.45 UIU/ML (ref 0.27–4.2)
WBC # BLD AUTO: 4.7 THOU/MM3 (ref 4.8–10.8)

## 2025-08-04 PROCEDURE — 80053 COMPREHEN METABOLIC PANEL: CPT

## 2025-08-04 PROCEDURE — 82105 ALPHA-FETOPROTEIN SERUM: CPT

## 2025-08-04 PROCEDURE — 85610 PROTHROMBIN TIME: CPT

## 2025-08-04 PROCEDURE — 84443 ASSAY THYROID STIM HORMONE: CPT

## 2025-08-04 PROCEDURE — 85025 COMPLETE CBC W/AUTO DIFF WBC: CPT

## 2025-08-04 PROCEDURE — 82977 ASSAY OF GGT: CPT

## 2025-08-04 PROCEDURE — 36415 COLL VENOUS BLD VENIPUNCTURE: CPT

## 2025-08-05 LAB — AFP SERPL-MCNC: 8.8 UG/L

## 2025-08-06 ENCOUNTER — RESULTS FOLLOW-UP (OUTPATIENT)
Dept: FAMILY MEDICINE CLINIC | Age: 75
End: 2025-08-06

## 2025-08-06 DIAGNOSIS — R77.2 HIGH ALPHA FETOPROTEIN (AFP) TUMOR MARKER: Primary | ICD-10-CM

## 2025-08-06 DIAGNOSIS — K74.60 CIRRHOSIS OF LIVER WITHOUT ASCITES, UNSPECIFIED HEPATIC CIRRHOSIS TYPE (HCC): ICD-10-CM

## 2025-08-18 ENCOUNTER — HOSPITAL ENCOUNTER (OUTPATIENT)
Dept: ULTRASOUND IMAGING | Age: 75
Discharge: HOME | End: 2025-08-18
Attending: FAMILY MEDICINE
Payer: COMMERCIAL

## 2025-08-18 DIAGNOSIS — R77.2 HIGH ALPHA FETOPROTEIN (AFP) TUMOR MARKER: ICD-10-CM

## 2025-08-18 DIAGNOSIS — K74.60 CIRRHOSIS OF LIVER WITHOUT ASCITES, UNSPECIFIED HEPATIC CIRRHOSIS TYPE (HCC): ICD-10-CM

## 2025-08-18 PROCEDURE — 76705 ECHO EXAM OF ABDOMEN: CPT

## 2025-08-19 ENCOUNTER — RESULTS FOLLOW-UP (OUTPATIENT)
Dept: FAMILY MEDICINE CLINIC | Age: 75
End: 2025-08-19

## (undated) DEVICE — Device

## (undated) DEVICE — Z DISCONTINUED GLOVE SURG SZ 8 L12IN FNGR THK94MIL TRNSLUC YEL LTX HYDRGEL

## (undated) DEVICE — GLOVE ORANGE PI 8   MSG9080

## (undated) DEVICE — PREP SOL PVP IODINE 4%  4 OZ/BTL

## (undated) DEVICE — IMPREGNATED GAUZE DRESSING: Brand: CUTICERIN 7.5X7.5CM CTN 50

## (undated) DEVICE — BANDAGE COMPR W4INXL5YD BLU LTX COHESIVE SELF ADH EZ TEAR

## (undated) DEVICE — SOLUTION IRRIG 1500ML STRL H2O USP POUR PLAS BTL

## (undated) DEVICE — NEEDLE HYPO 25GA L1IN PIVOTING SHLD FOR LUERLOCK SYR ECLIPSE

## (undated) DEVICE — SUTURE PROL SZ 4-0 L18IN NONABSORBABLE BLU L19MM PS-2 3/8 8682G

## (undated) DEVICE — Z INACTIVE USE 2660665 SOLUTION IRRIG 1000ML 0.9% SOD CHL USP POUR PLAS BTL

## (undated) DEVICE — Z DISCONTINUED USE 2273272 SOLUTION SURG PREP SCRUB POV IOD 7.5% 4 OZ